# Patient Record
Sex: MALE | Race: WHITE | NOT HISPANIC OR LATINO | Employment: FULL TIME | URBAN - METROPOLITAN AREA
[De-identification: names, ages, dates, MRNs, and addresses within clinical notes are randomized per-mention and may not be internally consistent; named-entity substitution may affect disease eponyms.]

---

## 2020-06-08 DIAGNOSIS — Z20.822 SUSPECTED COVID-19 VIRUS INFECTION: ICD-10-CM

## 2020-10-09 DIAGNOSIS — Z03.818 ENCOUNTER FOR OBSERVATION FOR SUSPECTED EXPOSURE TO OTHER BIOLOGICAL AGENTS RULED OUT: ICD-10-CM

## 2020-10-10 ENCOUNTER — LAB VISIT (OUTPATIENT)
Dept: URGENT CARE | Facility: CLINIC | Age: 30
End: 2020-10-10

## 2020-10-10 DIAGNOSIS — Z03.818 ENCOUNTER FOR OBSERVATION FOR SUSPECTED EXPOSURE TO OTHER BIOLOGICAL AGENTS RULED OUT: Primary | ICD-10-CM

## 2020-10-10 LAB
CTP QC/QA: YES
SARS-COV-2 RDRP RESP QL NAA+PROBE: NEGATIVE

## 2020-10-10 PROCEDURE — 99199 UNLISTED SPECIAL SVC PX/RPRT: CPT | Mod: S$GLB,,, | Performed by: EMERGENCY MEDICINE

## 2020-10-10 PROCEDURE — 99199 CV19 SPECIMEN HANDLING FEE / SWAB: ICD-10-PCS | Mod: S$GLB,,, | Performed by: EMERGENCY MEDICINE

## 2020-10-10 PROCEDURE — U0002 COVID-19 LAB TEST NON-CDC: HCPCS | Mod: QW,S$GLB,, | Performed by: EMERGENCY MEDICINE

## 2020-10-10 PROCEDURE — U0002: ICD-10-PCS | Mod: QW,S$GLB,, | Performed by: EMERGENCY MEDICINE

## 2021-04-13 ENCOUNTER — OCCUPATIONAL HEALTH (OUTPATIENT)
Dept: URGENT CARE | Facility: CLINIC | Age: 31
End: 2021-04-13

## 2021-04-13 DIAGNOSIS — Z20.822 ENCOUNTER FOR LABORATORY TESTING FOR COVID-19 VIRUS: Primary | ICD-10-CM

## 2021-04-13 LAB
CTP QC/QA: YES
SARS-COV-2 RDRP RESP QL NAA+PROBE: NEGATIVE

## 2021-04-13 PROCEDURE — U0002 COVID-19 LAB TEST NON-CDC: HCPCS | Mod: QW,S$GLB,, | Performed by: NURSE PRACTITIONER

## 2021-04-13 PROCEDURE — U0002: ICD-10-PCS | Mod: QW,S$GLB,, | Performed by: NURSE PRACTITIONER

## 2021-04-28 ENCOUNTER — PATIENT MESSAGE (OUTPATIENT)
Dept: RESEARCH | Facility: HOSPITAL | Age: 31
End: 2021-04-28

## 2021-05-11 ENCOUNTER — OCCUPATIONAL HEALTH (OUTPATIENT)
Dept: URGENT CARE | Facility: CLINIC | Age: 31
End: 2021-05-11

## 2021-05-11 DIAGNOSIS — Z11.9 ENCOUNTER FOR SCREENING EXAMINATION FOR INFECTIOUS DISEASE: Primary | ICD-10-CM

## 2021-05-11 LAB
CTP QC/QA: YES
SARS-COV-2 RDRP RESP QL NAA+PROBE: NEGATIVE

## 2021-05-11 PROCEDURE — U0002: ICD-10-PCS | Mod: QW,S$GLB,, | Performed by: PHYSICIAN ASSISTANT

## 2021-05-11 PROCEDURE — U0002 COVID-19 LAB TEST NON-CDC: HCPCS | Mod: QW,S$GLB,, | Performed by: PHYSICIAN ASSISTANT

## 2021-06-08 ENCOUNTER — OCCUPATIONAL HEALTH (OUTPATIENT)
Dept: URGENT CARE | Facility: CLINIC | Age: 31
End: 2021-06-08

## 2021-06-08 DIAGNOSIS — Z11.52 ENCOUNTER FOR SCREENING FOR COVID-19: Primary | ICD-10-CM

## 2021-06-08 LAB
CTP QC/QA: YES
SARS-COV-2 RDRP RESP QL NAA+PROBE: NEGATIVE

## 2021-06-08 PROCEDURE — U0002: ICD-10-PCS | Mod: QW,S$GLB,, | Performed by: NURSE PRACTITIONER

## 2021-06-08 PROCEDURE — U0002 COVID-19 LAB TEST NON-CDC: HCPCS | Mod: QW,S$GLB,, | Performed by: NURSE PRACTITIONER

## 2023-06-12 ENCOUNTER — OFFICE VISIT (OUTPATIENT)
Dept: INTERNAL MEDICINE | Facility: CLINIC | Age: 33
End: 2023-06-12
Payer: COMMERCIAL

## 2023-06-12 VITALS
HEIGHT: 64 IN | DIASTOLIC BLOOD PRESSURE: 92 MMHG | SYSTOLIC BLOOD PRESSURE: 136 MMHG | TEMPERATURE: 98 F | HEART RATE: 100 BPM | BODY MASS INDEX: 25.13 KG/M2 | RESPIRATION RATE: 20 BRPM | OXYGEN SATURATION: 98 % | WEIGHT: 147.19 LBS

## 2023-06-12 DIAGNOSIS — F41.9 ANXIETY: Primary | ICD-10-CM

## 2023-06-12 DIAGNOSIS — G43.109 MIGRAINE WITH AURA AND WITHOUT STATUS MIGRAINOSUS, NOT INTRACTABLE: ICD-10-CM

## 2023-06-12 DIAGNOSIS — R03.0 ELEVATED BLOOD PRESSURE READING: ICD-10-CM

## 2023-06-12 PROCEDURE — 99203 PR OFFICE/OUTPT VISIT, NEW, LEVL III, 30-44 MIN: ICD-10-PCS | Mod: S$GLB,,, | Performed by: PHYSICIAN ASSISTANT

## 2023-06-12 PROCEDURE — 99999 PR PBB SHADOW E&M-EST. PATIENT-LVL IV: ICD-10-PCS | Mod: PBBFAC,,, | Performed by: PHYSICIAN ASSISTANT

## 2023-06-12 PROCEDURE — 3080F PR MOST RECENT DIASTOLIC BLOOD PRESSURE >= 90 MM HG: ICD-10-PCS | Mod: CPTII,S$GLB,, | Performed by: PHYSICIAN ASSISTANT

## 2023-06-12 PROCEDURE — 3008F PR BODY MASS INDEX (BMI) DOCUMENTED: ICD-10-PCS | Mod: CPTII,S$GLB,, | Performed by: PHYSICIAN ASSISTANT

## 2023-06-12 PROCEDURE — 1160F RVW MEDS BY RX/DR IN RCRD: CPT | Mod: CPTII,S$GLB,, | Performed by: PHYSICIAN ASSISTANT

## 2023-06-12 PROCEDURE — 1159F PR MEDICATION LIST DOCUMENTED IN MEDICAL RECORD: ICD-10-PCS | Mod: CPTII,S$GLB,, | Performed by: PHYSICIAN ASSISTANT

## 2023-06-12 PROCEDURE — 3075F SYST BP GE 130 - 139MM HG: CPT | Mod: CPTII,S$GLB,, | Performed by: PHYSICIAN ASSISTANT

## 2023-06-12 PROCEDURE — 99999 PR PBB SHADOW E&M-EST. PATIENT-LVL IV: CPT | Mod: PBBFAC,,, | Performed by: PHYSICIAN ASSISTANT

## 2023-06-12 PROCEDURE — 99203 OFFICE O/P NEW LOW 30 MIN: CPT | Mod: S$GLB,,, | Performed by: PHYSICIAN ASSISTANT

## 2023-06-12 PROCEDURE — 3080F DIAST BP >= 90 MM HG: CPT | Mod: CPTII,S$GLB,, | Performed by: PHYSICIAN ASSISTANT

## 2023-06-12 PROCEDURE — 3075F PR MOST RECENT SYSTOLIC BLOOD PRESS GE 130-139MM HG: ICD-10-PCS | Mod: CPTII,S$GLB,, | Performed by: PHYSICIAN ASSISTANT

## 2023-06-12 PROCEDURE — 1160F PR REVIEW ALL MEDS BY PRESCRIBER/CLIN PHARMACIST DOCUMENTED: ICD-10-PCS | Mod: CPTII,S$GLB,, | Performed by: PHYSICIAN ASSISTANT

## 2023-06-12 PROCEDURE — 1159F MED LIST DOCD IN RCRD: CPT | Mod: CPTII,S$GLB,, | Performed by: PHYSICIAN ASSISTANT

## 2023-06-12 PROCEDURE — 3008F BODY MASS INDEX DOCD: CPT | Mod: CPTII,S$GLB,, | Performed by: PHYSICIAN ASSISTANT

## 2023-06-12 RX ORDER — HYDROXYZINE HYDROCHLORIDE 25 MG/1
25 TABLET, FILM COATED ORAL 3 TIMES DAILY PRN
Qty: 30 TABLET | Refills: 0 | Status: SHIPPED | OUTPATIENT
Start: 2023-06-12

## 2023-06-12 RX ORDER — BUTALBITAL, ACETAMINOPHEN AND CAFFEINE 50; 325; 40 MG/1; MG/1; MG/1
1 TABLET ORAL EVERY 6 HOURS PRN
Qty: 30 TABLET | Refills: 0 | Status: SHIPPED | OUTPATIENT
Start: 2023-06-12 | End: 2023-06-13 | Stop reason: SDUPTHER

## 2023-06-12 NOTE — LETTER
June 12, 2023    Caesar Abreu  [address] [place] [address]             [facility]  Internal Medicine  [address] [place] [id number]  Phone: [phone]  Fax: [phone]   June 12, 2023     Patient: Caesar Abreu   YOB: 1990   Date of Visit: 6/12/2023       To Whom it May Concern:    Caesar Abreu was seen in my clinic on 6/12/2023. He may return to work on 06/13/2023 .    Please excuse him from any classes or work missed.    If you have any questions or concerns, please don't hesitate to call.    Sincerely,         Kitty Oropeza PA-C

## 2023-06-13 ENCOUNTER — PATIENT MESSAGE (OUTPATIENT)
Dept: INTERNAL MEDICINE | Facility: CLINIC | Age: 33
End: 2023-06-13
Payer: COMMERCIAL

## 2023-06-13 PROBLEM — F41.9 ANXIETY: Status: ACTIVE | Noted: 2023-06-13

## 2023-06-13 PROBLEM — G43.109 MIGRAINE WITH AURA AND WITHOUT STATUS MIGRAINOSUS, NOT INTRACTABLE: Status: ACTIVE | Noted: 2023-06-13

## 2023-06-13 PROBLEM — R03.0 ELEVATED BLOOD PRESSURE READING: Status: ACTIVE | Noted: 2023-06-13

## 2023-06-13 RX ORDER — BUTALBITAL, ACETAMINOPHEN AND CAFFEINE 50; 325; 40 MG/1; MG/1; MG/1
1 TABLET ORAL EVERY 6 HOURS PRN
Qty: 30 TABLET | Refills: 0 | Status: SHIPPED | OUTPATIENT
Start: 2023-06-13

## 2023-06-13 NOTE — ASSESSMENT & PLAN NOTE
Start Fioricet as needed, reports migraine headaches weekly, has never seen Neurology, Excedrin helps

## 2023-06-13 NOTE — PROGRESS NOTES
Subjective:       Patient ID: Caesar Abreu is a 32 y.o. male.    Chief Complaint: Hypertension and Headache      HPI  31 y/o male presents for f/u from ED visit for lightheadedness/near-syncope, anxiety, chest tightness, anxiety, elevated BP and headache on 6/9/23. Cardiac work up was negative. CT head was negative. BP was 136/90. Patient reports family history of cardiac disease (HTN and MI).     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 He has history of migraines but this headache was different - location was parietal, BL, to posterior neck, throbbing without phonophobia, photophobia or N/V. Patient took BP on rig which was 155-172/ with HR 60-89. Over the last few months he has stopped energy drinks and pre-workout but will drink 1 cup of coffee and exercises almost daily. Reports sleeps ok but has had anxiety for a long time off and on.     Review of Systems   Constitutional:  Negative for chills, diaphoresis and fever.   Eyes:  Positive for photophobia and visual disturbance (reports blurred spots in his peripheral vision before migraine headaches start). Negative for pain and redness.   Respiratory:  Positive for chest tightness. Negative for shortness of breath.    Cardiovascular:  Negative for chest pain.   Gastrointestinal:  Negative for nausea and vomiting.   Neurological:  Positive for light-headedness and headaches. Negative for dizziness, speech difficulty, weakness and numbness.   Psychiatric/Behavioral:  Negative for dysphoric mood. The patient is nervous/anxious.      Objective:      Physical Exam  Vitals and nursing note reviewed.   Constitutional:       General: He is not in acute distress.     Appearance: He is well-developed.   HENT:      Head: Normocephalic and atraumatic.   Eyes:      General: Lids are normal. No scleral icterus.     Extraocular  Movements: Extraocular movements intact.      Conjunctiva/sclera: Conjunctivae normal.   Cardiovascular:      Rate and Rhythm: Normal rate and regular rhythm.   Pulmonary:      Effort: Pulmonary effort is normal.      Breath sounds: Normal breath sounds. No decreased breath sounds, wheezing, rhonchi or rales.   Neurological:      Mental Status: He is alert.      Cranial Nerves: No cranial nerve deficit.   Psychiatric:         Mood and Affect: Mood and affect normal.       Assessment:       1. Anxiety    2. Elevated blood pressure reading    3. Migraine with aura and without status migrainosus, not intractable        Plan:   1. Anxiety  Assessment & Plan:  Start hydroxyzine as needed. Encouraged relaxation, deep breathing exercises, reading    Orders:  -     hydrOXYzine HCL (ATARAX) 25 MG tablet; Take 1 tablet (25 mg total) by mouth 3 (three) times daily as needed for Anxiety.  Dispense: 30 tablet; Refill: 0    2. Elevated blood pressure reading  Assessment & Plan:  /92, declines medication at this time, will RTC 2 weeks for NV recheck BP      3. Migraine with aura and without status migrainosus, not intractable  Assessment & Plan:  Start Fioricet as needed, reports migraine headaches weekly, has never seen Neurology, Excedrin helps    Orders:  -     butalbital-acetaminophen-caffeine -40 mg (FIORICET, ESGIC) -40 mg per tablet; Take 1 tablet by mouth every 6 (six) hours as needed for Headaches.  Dispense: 30 tablet; Refill: 0

## 2023-06-14 ENCOUNTER — TELEPHONE (OUTPATIENT)
Dept: INTERNAL MEDICINE | Facility: CLINIC | Age: 33
End: 2023-06-14
Payer: COMMERCIAL

## 2023-06-14 NOTE — TELEPHONE ENCOUNTER
Spoke to Kathy with [facility] and gave her the DANA number and verbal order for the Fioricet.

## 2023-06-14 NOTE — TELEPHONE ENCOUNTER
----- Message from Alesha Serna sent at 6/13/2023  4:37 PM CDT -----  Contact: [facility]Jeimy Munson called regarding the medication butalbital-acetaminophen-caffeine -40 mg (FIORICET, ESGIC) -40 mg per tablet. The DANA information is missing. Please rescribe.    Thanks  TS

## 2023-06-27 ENCOUNTER — OFFICE VISIT (OUTPATIENT)
Dept: INTERNAL MEDICINE | Facility: CLINIC | Age: 33
End: 2023-06-27
Payer: COMMERCIAL

## 2023-06-27 ENCOUNTER — CLINICAL SUPPORT (OUTPATIENT)
Dept: INTERNAL MEDICINE | Facility: CLINIC | Age: 33
End: 2023-06-27
Payer: COMMERCIAL

## 2023-06-27 VITALS
HEART RATE: 59 BPM | OXYGEN SATURATION: 98 % | SYSTOLIC BLOOD PRESSURE: 118 MMHG | BODY MASS INDEX: 25.75 KG/M2 | WEIGHT: 150.81 LBS | TEMPERATURE: 98 F | HEIGHT: 64 IN | DIASTOLIC BLOOD PRESSURE: 80 MMHG

## 2023-06-27 VITALS — SYSTOLIC BLOOD PRESSURE: 120 MMHG | DIASTOLIC BLOOD PRESSURE: 84 MMHG

## 2023-06-27 DIAGNOSIS — G43.109 MIGRAINE WITH AURA AND WITHOUT STATUS MIGRAINOSUS, NOT INTRACTABLE: ICD-10-CM

## 2023-06-27 DIAGNOSIS — Z91.018 FOOD ALLERGY: ICD-10-CM

## 2023-06-27 DIAGNOSIS — Z00.00 ROUTINE GENERAL MEDICAL EXAMINATION AT A HEALTH CARE FACILITY: Primary | ICD-10-CM

## 2023-06-27 DIAGNOSIS — Z23 NEED FOR DIPHTHERIA-TETANUS-PERTUSSIS (TDAP) VACCINE: ICD-10-CM

## 2023-06-27 DIAGNOSIS — H53.8 BLURRED VISION: ICD-10-CM

## 2023-06-27 DIAGNOSIS — F41.9 ANXIETY: ICD-10-CM

## 2023-06-27 PROCEDURE — 3079F DIAST BP 80-89 MM HG: CPT | Mod: CPTII,S$GLB,, | Performed by: INTERNAL MEDICINE

## 2023-06-27 PROCEDURE — 3008F BODY MASS INDEX DOCD: CPT | Mod: CPTII,S$GLB,, | Performed by: INTERNAL MEDICINE

## 2023-06-27 PROCEDURE — 90471 IMMUNIZATION ADMIN: CPT | Mod: S$GLB,,, | Performed by: INTERNAL MEDICINE

## 2023-06-27 PROCEDURE — 90715 TDAP VACCINE 7 YRS/> IM: CPT | Mod: S$GLB,,, | Performed by: INTERNAL MEDICINE

## 2023-06-27 PROCEDURE — 99395 PR PREVENTIVE VISIT,EST,18-39: ICD-10-PCS | Mod: 25,S$GLB,, | Performed by: INTERNAL MEDICINE

## 2023-06-27 PROCEDURE — 99999 PR PBB SHADOW E&M-EST. PATIENT-LVL IV: ICD-10-PCS | Mod: PBBFAC,,, | Performed by: INTERNAL MEDICINE

## 2023-06-27 PROCEDURE — 99395 PREV VISIT EST AGE 18-39: CPT | Mod: 25,S$GLB,, | Performed by: INTERNAL MEDICINE

## 2023-06-27 PROCEDURE — 1159F MED LIST DOCD IN RCRD: CPT | Mod: CPTII,S$GLB,, | Performed by: INTERNAL MEDICINE

## 2023-06-27 PROCEDURE — 90471 TDAP VACCINE GREATER THAN OR EQUAL TO 7YO IM: ICD-10-PCS | Mod: S$GLB,,, | Performed by: INTERNAL MEDICINE

## 2023-06-27 PROCEDURE — 99214 OFFICE O/P EST MOD 30 MIN: CPT | Mod: 25,S$GLB,, | Performed by: INTERNAL MEDICINE

## 2023-06-27 PROCEDURE — 99214 PR OFFICE/OUTPT VISIT, EST, LEVL IV, 30-39 MIN: ICD-10-PCS | Mod: 25,S$GLB,, | Performed by: INTERNAL MEDICINE

## 2023-06-27 PROCEDURE — 99999 PR PBB SHADOW E&M-EST. PATIENT-LVL I: CPT | Mod: PBBFAC,,,

## 2023-06-27 PROCEDURE — 3008F PR BODY MASS INDEX (BMI) DOCUMENTED: ICD-10-PCS | Mod: CPTII,S$GLB,, | Performed by: INTERNAL MEDICINE

## 2023-06-27 PROCEDURE — 3074F SYST BP LT 130 MM HG: CPT | Mod: CPTII,S$GLB,, | Performed by: INTERNAL MEDICINE

## 2023-06-27 PROCEDURE — 3074F PR MOST RECENT SYSTOLIC BLOOD PRESSURE < 130 MM HG: ICD-10-PCS | Mod: CPTII,S$GLB,, | Performed by: INTERNAL MEDICINE

## 2023-06-27 PROCEDURE — 99999 PR PBB SHADOW E&M-EST. PATIENT-LVL I: ICD-10-PCS | Mod: PBBFAC,,,

## 2023-06-27 PROCEDURE — 3079F PR MOST RECENT DIASTOLIC BLOOD PRESSURE 80-89 MM HG: ICD-10-PCS | Mod: CPTII,S$GLB,, | Performed by: INTERNAL MEDICINE

## 2023-06-27 PROCEDURE — 90715 TDAP VACCINE GREATER THAN OR EQUAL TO 7YO IM: ICD-10-PCS | Mod: S$GLB,,, | Performed by: INTERNAL MEDICINE

## 2023-06-27 PROCEDURE — 99999 PR PBB SHADOW E&M-EST. PATIENT-LVL IV: CPT | Mod: PBBFAC,,, | Performed by: INTERNAL MEDICINE

## 2023-06-27 PROCEDURE — 1159F PR MEDICATION LIST DOCUMENTED IN MEDICAL RECORD: ICD-10-PCS | Mod: CPTII,S$GLB,, | Performed by: INTERNAL MEDICINE

## 2023-06-27 RX ORDER — TOPIRAMATE 25 MG/1
25 TABLET ORAL NIGHTLY
Qty: 90 TABLET | Refills: 0 | Status: SHIPPED | OUTPATIENT
Start: 2023-06-27 | End: 2023-09-18

## 2023-06-27 NOTE — PROGRESS NOTES
Subjective:      Patient ID: Caesar Abreu is a 32 y.o. male.    Chief Complaint: Establish Care    HPI      32 y.o. with  Patient Active Problem List   Diagnosis    Elevated blood pressure reading    Anxiety    Migraine with aura and without status migrainosus, not intractable    Routine general medical examination at a health care facility     Past Medical History:   Diagnosis Date    History of chicken pox        Here today for annual prev exam.  Compliant with meds without significant side effects. Energy and appetite are good.     Pt also c/o migraines as a child. Has had HAs 2 to 3 days per week. Typically uses tylenol/ibu/excedrin. Previously migraines were once monthly. Typical migraine is usually behind his eyes. aching. . Has assoc photophobia. Has visual aura of white spots and decreased peripheral vision.  No phonophobia.  Was given butalbital recently. Did not start as tyl/ibu/excedrin is working.   PE: cn 2 -12 intact, ncat, nl gait, goot ftn    Pt also c/o months of sharp pain temporal area and blurred vision when driving. Worse with bright light. Seems to occur when looking up from dash into the distance. Can alternate sides. Lasts for seconds.     Pt also reports throat itching with shrimp, crawfish, crabs.    Pt also c/o anxiety although Does not feel anxious regularly. Has some anxiety feelings about once q 3 months. Has hydroxyzine prn.     Past Surgical History:   Procedure Laterality Date    ADENOIDECTOMY      arm fracture Right     FRACTURE SURGERY  2007    Right arm    TYMPANOSTOMY TUBE PLACEMENT       Social History     Socioeconomic History    Marital status: Single   Tobacco Use    Smoking status: Never    Smokeless tobacco: Never   Substance and Sexual Activity    Alcohol use: Yes     Alcohol/week: 2.0 standard drinks     Types: 2 Shots of liquor per week     Comment: twice a week     Drug use: Never    Sexual activity: Yes     Partners: Female     Birth control/protection: Inserts  "    family history includes COPD in his maternal grandfather; Cancer in his father, maternal grandfather, and maternal grandmother; Diabetes in his maternal grandmother; Hearing loss in his maternal grandmother; No Known Problems in his mother; Stroke in his maternal grandmother. Father dx with colon cancer at 69 yo.     Review of Systems   Constitutional:  Negative for chills and fever.   HENT:  Negative for ear pain and sore throat.    Respiratory:  Negative for cough.    Cardiovascular:  Negative for chest pain.   Gastrointestinal:  Negative for abdominal pain and blood in stool.   Genitourinary:  Negative for dysuria and hematuria.   Neurological:  Negative for seizures and syncope.   Objective:   /80 (BP Location: Left arm, Patient Position: Sitting, BP Method: Medium (Manual))   Pulse (!) 59   Temp 97.5 °F (36.4 °C) (Tympanic)   Ht 5' 4" (1.626 m)   Wt 68.4 kg (150 lb 12.7 oz)   SpO2 98%   BMI 25.88 kg/m²     Physical Exam  Constitutional:       General: He is not in acute distress.     Appearance: He is well-developed.   HENT:      Head: Normocephalic and atraumatic.   Eyes:      Extraocular Movements: Extraocular movements intact.   Neck:      Thyroid: No thyromegaly.   Cardiovascular:      Rate and Rhythm: Normal rate and regular rhythm.   Pulmonary:      Breath sounds: Normal breath sounds. No wheezing or rales.   Abdominal:      General: Bowel sounds are normal.      Palpations: Abdomen is soft.      Tenderness: There is no abdominal tenderness.   Musculoskeletal:         General: No swelling.      Cervical back: Neck supple. No rigidity.   Lymphadenopathy:      Cervical: No cervical adenopathy.   Skin:     General: Skin is warm and dry.   Neurological:      Mental Status: He is alert and oriented to person, place, and time.   Psychiatric:         Behavior: Behavior normal.       Lab Results   Component Value Date    WBC 11.20 06/10/2023    HGB 15.0 06/10/2023    HCT 41.6 06/10/2023    MCV 87 " 06/10/2023     06/10/2023    ALT 43 06/10/2023    AST 39 06/10/2023     06/10/2023    K 4.1 06/10/2023    CALCIUM 9.2 06/10/2023     06/10/2023    CO2 25 06/10/2023    BUN 16 06/10/2023    CREATININE 0.95 06/10/2023    EGFRNORACEVR >60 06/10/2023    TSH 2.21 06/10/2023     06/10/2023          The ASCVD Risk score (Fred THOMAS, et al., 2019) failed to calculate for the following reasons:    The 2019 ASCVD risk score is only valid for ages 40 to 79     Assessment:     1. Routine general medical examination at a health care facility    2. Blurred vision    3. Food allergy    4. Need for diphtheria-tetanus-pertussis (Tdap) vaccine    5. Migraine with aura and without status migrainosus, not intractable    6. Anxiety      Plan:   1. Routine general medical examination at a health care facility  Overview:  Heart healthy diet, regular exercise, and regular use of sunscreen.    reviewed    Orders:  -     Lipid Panel; Future; Expected date: 06/27/2023  -     Hepatitis C Antibody; Future; Expected date: 06/27/2023  -     HIV 1/2 Ag/Ab (4th Gen); Future; Expected date: 06/27/2023    2. Blurred vision  -     Ambulatory referral/consult to Optometry; Future; Expected date: 07/04/2023    3. Food allergy  -     Ambulatory referral/consult to Allergy; Future; Expected date: 07/04/2023    4. Need for diphtheria-tetanus-pertussis (Tdap) vaccine  -     (In Office Administered) Tdap Vaccine    5. Migraine with aura and without status migrainosus, not intractable  -     topiramate (TOPAMAX) 25 MG tablet; Take 1 tablet (25 mg total) by mouth every evening.  Dispense: 90 tablet; Refill: 0    6. Anxiety  Overview:  Stable on prn hydroxyzine.           There are no Patient Instructions on file for this visit.    Future Appointments   Date Time Provider Department Center   7/3/2023  8:55 AM LABORATORY, [facility] [place]   8/21/2023  8:15 AM Apolinar Caballero OD [facility] OPHTHAL [facility] [place]   9/19/2023  3:00 PM Chata HALL  MD [facility] Alexandra ALLERGY [place]   10/16/2023  8:40 AM MD VANNA Hernandez IM [place]           Follow up in about 4 weeks (around 7/25/2023), or if symptoms worsen or fail to improve, for fasting labs next avail.

## 2023-07-01 PROBLEM — Z00.00 ROUTINE GENERAL MEDICAL EXAMINATION AT A HEALTH CARE FACILITY: Status: ACTIVE | Noted: 2023-07-01

## 2023-07-03 ENCOUNTER — LAB VISIT (OUTPATIENT)
Dept: LAB | Facility: HOSPITAL | Age: 33
End: 2023-07-03
Attending: INTERNAL MEDICINE
Payer: COMMERCIAL

## 2023-07-03 DIAGNOSIS — Z00.00 ROUTINE GENERAL MEDICAL EXAMINATION AT A HEALTH CARE FACILITY: ICD-10-CM

## 2023-07-03 LAB
CHOLEST SERPL-MCNC: 166 MG/DL (ref 120–199)
CHOLEST/HDLC SERPL: 3.7 {RATIO} (ref 2–5)
HCV AB SERPL QL IA: NORMAL
HDLC SERPL-MCNC: 45 MG/DL (ref 40–75)
HDLC SERPL: 27.1 % (ref 20–50)
HIV 1+2 AB+HIV1 P24 AG SERPL QL IA: NORMAL
LDLC SERPL CALC-MCNC: 105.6 MG/DL (ref 63–159)
NONHDLC SERPL-MCNC: 121 MG/DL
TRIGL SERPL-MCNC: 77 MG/DL (ref 30–150)

## 2023-07-03 PROCEDURE — 86803 HEPATITIS C AB TEST: CPT | Performed by: INTERNAL MEDICINE

## 2023-07-03 PROCEDURE — 36415 COLL VENOUS BLD VENIPUNCTURE: CPT | Performed by: INTERNAL MEDICINE

## 2023-07-03 PROCEDURE — 87389 HIV-1 AG W/HIV-1&-2 AB AG IA: CPT | Performed by: INTERNAL MEDICINE

## 2023-07-03 PROCEDURE — 80061 LIPID PANEL: CPT | Performed by: INTERNAL MEDICINE

## 2023-08-20 ENCOUNTER — TELEPHONE (OUTPATIENT)
Dept: ALLERGY | Facility: CLINIC | Age: 33
End: 2023-08-20
Payer: COMMERCIAL

## 2023-08-20 NOTE — TELEPHONE ENCOUNTER
Called pt and informed him that we will have to cancel the appt due to Dr. Alexandra being out due to an emergency. informed him that we will call him back to reschedule once we know for sure when she will be back in clinic.

## 2023-08-21 ENCOUNTER — OFFICE VISIT (OUTPATIENT)
Dept: OPHTHALMOLOGY | Facility: CLINIC | Age: 33
End: 2023-08-21
Payer: COMMERCIAL

## 2023-08-21 DIAGNOSIS — H52.203 HYPEROPIA OF BOTH EYES WITH ASTIGMATISM: ICD-10-CM

## 2023-08-21 DIAGNOSIS — H53.8 BLURRED VISION: ICD-10-CM

## 2023-08-21 DIAGNOSIS — H53.9 VISUAL DISTURBANCE: Primary | ICD-10-CM

## 2023-08-21 DIAGNOSIS — R51.9 FREQUENT HEADACHES: ICD-10-CM

## 2023-08-21 DIAGNOSIS — H52.03 HYPEROPIA OF BOTH EYES WITH ASTIGMATISM: ICD-10-CM

## 2023-08-21 PROCEDURE — 92004 COMPRE OPH EXAM NEW PT 1/>: CPT | Mod: S$GLB,,, | Performed by: OPTOMETRIST

## 2023-08-21 PROCEDURE — 99999 PR PBB SHADOW E&M-EST. PATIENT-LVL III: CPT | Mod: PBBFAC,,, | Performed by: OPTOMETRIST

## 2023-08-21 PROCEDURE — 92015 PR REFRACTION: ICD-10-PCS | Mod: S$GLB,,, | Performed by: OPTOMETRIST

## 2023-08-21 PROCEDURE — 1159F MED LIST DOCD IN RCRD: CPT | Mod: CPTII,S$GLB,, | Performed by: OPTOMETRIST

## 2023-08-21 PROCEDURE — 99999 PR PBB SHADOW E&M-EST. PATIENT-LVL III: ICD-10-PCS | Mod: PBBFAC,,, | Performed by: OPTOMETRIST

## 2023-08-21 PROCEDURE — 92015 DETERMINE REFRACTIVE STATE: CPT | Mod: S$GLB,,, | Performed by: OPTOMETRIST

## 2023-08-21 PROCEDURE — 92004 PR EYE EXAM, NEW PATIENT,COMPREHESV: ICD-10-PCS | Mod: S$GLB,,, | Performed by: OPTOMETRIST

## 2023-08-21 PROCEDURE — 1159F PR MEDICATION LIST DOCUMENTED IN MEDICAL RECORD: ICD-10-PCS | Mod: CPTII,S$GLB,, | Performed by: OPTOMETRIST

## 2023-08-21 NOTE — PROGRESS NOTES
HPI     Annual Exam            Comments: NP to DKT  Patient here today for yearly eye exam          Comments    Vision changes since last eye exam?: Yes at distance  No correction     Any eye pain today: No    Other ocular symptoms: No    Interested in contact lens fitting today? No                      Last edited by Sonia Guerrero, PCT on 8/21/2023  8:19 AM.            Assessment /Plan     For exam results, see Encounter Report.    Visual disturbance  Refracts well on exam today. Mrx provided for PRN. RTC for HVF 30-2 in 4 weeks.     Hyperopia of both eyes with astigmatism  Eyeglass Final Rx       Eyeglass Final Rx         Sphere Cylinder Axis    Right +0.50 +1.25 006    Left +0.50 +0.50 011      Type: SVL    Expiration Date: 8/21/2024                    Frequent headaches  Not likely ocular in origin  Minimal refractive error  Normal, well-perfused optic nerves bilaterally with no edema  Continue care with PCP and/or specialists     RTC 4 weeks for HVF 30-2, headache f/u and pupil recheck. Sooner if any changes to vision or worsening symptoms.

## 2023-09-17 DIAGNOSIS — G43.109 MIGRAINE WITH AURA AND WITHOUT STATUS MIGRAINOSUS, NOT INTRACTABLE: ICD-10-CM

## 2023-09-18 ENCOUNTER — OFFICE VISIT (OUTPATIENT)
Dept: ALLERGY | Facility: CLINIC | Age: 33
End: 2023-09-18
Payer: COMMERCIAL

## 2023-09-18 ENCOUNTER — LAB VISIT (OUTPATIENT)
Dept: LAB | Facility: HOSPITAL | Age: 33
End: 2023-09-18
Attending: ALLERGY & IMMUNOLOGY
Payer: COMMERCIAL

## 2023-09-18 VITALS
HEART RATE: 53 BPM | BODY MASS INDEX: 25.67 KG/M2 | TEMPERATURE: 98 F | HEIGHT: 64 IN | WEIGHT: 150.38 LBS | SYSTOLIC BLOOD PRESSURE: 143 MMHG | DIASTOLIC BLOOD PRESSURE: 86 MMHG

## 2023-09-18 DIAGNOSIS — Z91.018 FOOD ALLERGY: ICD-10-CM

## 2023-09-18 DIAGNOSIS — Z91.018 FOOD ALLERGY: Primary | ICD-10-CM

## 2023-09-18 DIAGNOSIS — J31.0 RHINITIS, UNSPECIFIED TYPE: ICD-10-CM

## 2023-09-18 DIAGNOSIS — J30.81 ALLERGIC RHINITIS DUE TO CATS: ICD-10-CM

## 2023-09-18 DIAGNOSIS — J30.89 ALLERGIC RHINITIS DUE TO DERMATOPHAGOIDES PTERONYSSINUS: ICD-10-CM

## 2023-09-18 DIAGNOSIS — R03.0 ELEVATED BLOOD PRESSURE READING: ICD-10-CM

## 2023-09-18 DIAGNOSIS — Z88.8: ICD-10-CM

## 2023-09-18 PROCEDURE — 86003 ALLG SPEC IGE CRUDE XTRC EA: CPT | Mod: 59 | Performed by: ALLERGY & IMMUNOLOGY

## 2023-09-18 PROCEDURE — 3079F PR MOST RECENT DIASTOLIC BLOOD PRESSURE 80-89 MM HG: ICD-10-PCS | Mod: CPTII,S$GLB,, | Performed by: ALLERGY & IMMUNOLOGY

## 2023-09-18 PROCEDURE — 3008F PR BODY MASS INDEX (BMI) DOCUMENTED: ICD-10-PCS | Mod: CPTII,S$GLB,, | Performed by: ALLERGY & IMMUNOLOGY

## 2023-09-18 PROCEDURE — 95004 PR ALLERGY SKIN TESTS,ALLERGENS: ICD-10-PCS | Mod: S$GLB,,, | Performed by: ALLERGY & IMMUNOLOGY

## 2023-09-18 PROCEDURE — 99204 OFFICE O/P NEW MOD 45 MIN: CPT | Mod: 25,S$GLB,, | Performed by: ALLERGY & IMMUNOLOGY

## 2023-09-18 PROCEDURE — 99999 PR PBB SHADOW E&M-EST. PATIENT-LVL IV: ICD-10-PCS | Mod: PBBFAC,,, | Performed by: ALLERGY & IMMUNOLOGY

## 2023-09-18 PROCEDURE — 3077F SYST BP >= 140 MM HG: CPT | Mod: CPTII,S$GLB,, | Performed by: ALLERGY & IMMUNOLOGY

## 2023-09-18 PROCEDURE — 3077F PR MOST RECENT SYSTOLIC BLOOD PRESSURE >= 140 MM HG: ICD-10-PCS | Mod: CPTII,S$GLB,, | Performed by: ALLERGY & IMMUNOLOGY

## 2023-09-18 PROCEDURE — 99204 PR OFFICE/OUTPT VISIT, NEW, LEVL IV, 45-59 MIN: ICD-10-PCS | Mod: 25,S$GLB,, | Performed by: ALLERGY & IMMUNOLOGY

## 2023-09-18 PROCEDURE — 3079F DIAST BP 80-89 MM HG: CPT | Mod: CPTII,S$GLB,, | Performed by: ALLERGY & IMMUNOLOGY

## 2023-09-18 PROCEDURE — 1159F MED LIST DOCD IN RCRD: CPT | Mod: CPTII,S$GLB,, | Performed by: ALLERGY & IMMUNOLOGY

## 2023-09-18 PROCEDURE — 1159F PR MEDICATION LIST DOCUMENTED IN MEDICAL RECORD: ICD-10-PCS | Mod: CPTII,S$GLB,, | Performed by: ALLERGY & IMMUNOLOGY

## 2023-09-18 PROCEDURE — 3008F BODY MASS INDEX DOCD: CPT | Mod: CPTII,S$GLB,, | Performed by: ALLERGY & IMMUNOLOGY

## 2023-09-18 PROCEDURE — 95004 PERQ TESTS W/ALRGNC XTRCS: CPT | Mod: S$GLB,,, | Performed by: ALLERGY & IMMUNOLOGY

## 2023-09-18 PROCEDURE — 99999 PR PBB SHADOW E&M-EST. PATIENT-LVL IV: CPT | Mod: PBBFAC,,, | Performed by: ALLERGY & IMMUNOLOGY

## 2023-09-18 PROCEDURE — 86003 ALLG SPEC IGE CRUDE XTRC EA: CPT | Performed by: ALLERGY & IMMUNOLOGY

## 2023-09-18 PROCEDURE — 36415 COLL VENOUS BLD VENIPUNCTURE: CPT | Performed by: ALLERGY & IMMUNOLOGY

## 2023-09-18 RX ORDER — TOPIRAMATE 25 MG/1
25 TABLET ORAL NIGHTLY
Qty: 90 TABLET | Refills: 0 | Status: SHIPPED | OUTPATIENT
Start: 2023-09-18 | End: 2023-11-13 | Stop reason: SDUPTHER

## 2023-09-18 RX ORDER — LEVOCETIRIZINE DIHYDROCHLORIDE 5 MG/1
5 TABLET, FILM COATED ORAL NIGHTLY
Qty: 30 TABLET | Refills: 11 | Status: SHIPPED | OUTPATIENT
Start: 2023-09-18 | End: 2024-09-17

## 2023-09-18 RX ORDER — EPINEPHRINE 0.3 MG/.3ML
1 INJECTION SUBCUTANEOUS ONCE
Qty: 2 EACH | Refills: 3 | Status: SHIPPED | OUTPATIENT
Start: 2023-09-18 | End: 2023-11-14

## 2023-09-18 NOTE — PATIENT INSTRUCTIONS
Dust mites are microscopic insect-like pests that live in house dust. They feed on dead skin or dander that are shed by people and pets.They can worsen asthma and allergies. Dust mites live in mattresses, bedding, upholstered furniture, carpets, and curtains in your home. No matter how clean a home is, dust mites cannot be completely eliminated. A number of things can be done to reduce the number of dust mites:  -Use a dehumidifier or air conditioner to maintain humidity levels at, or below, 50 percent.  -Encase mattress and pillows in dust mite- proof or allergen impermeable covers.  -Wash all bedding and blankets once a week in hot water, 130 to 140 degrees Fahrenheit, to kill dust mites. Non- washable bedding can be frozen overnight.  -Replace wool or feathered bedding products with synthetic materials, and traditional stuffed animals with washable ones.  -In bedrooms, replace wall to wall carpeting with bare floors, and remove fabric curtains and upholstered furniture, whenever possible.  -Use a damp mop or rag to remove dust. Never use a dry cloth, as it stirs up allergens.  -Use a double-layered microfilter bag or a HEPA filter in your vacuum .  -Wear a mask while vacuuming, and stay out of the vacuuming area for 20 minutes after vacuuming, to allow dust and allergens to settle.   Dust mites are microscopic insect-like pests that live in house dust. They feed on dead skin or dander that are shed by people and pets.They can worsen asthma and allergies. Dust mites live in mattresses, bedding, upholstered furniture, carpets, and curtains in your home. No matter how clean a home is, dust mites cannot be completely eliminated. A number of things can be done to reduce the number of dust mites:  -Use a dehumidifier or air conditioner to maintain humidity levels at, or below, 50 percent.  -Encase mattress and pillows in dust mite- proof or allergen impermeable covers.  -Wash all bedding and blankets once a week  in hot water, 130 to 140 degrees Fahrenheit, to kill dust mites. Non- washable bedding can be frozen overnight.  -Replace wool or feathered bedding products with synthetic materials, and traditional stuffed animals with washable ones.  -In bedrooms, replace wall to wall carpeting with bare floors, and remove fabric curtains and upholstered furniture, whenever possible.  -Use a damp mop or rag to remove dust. Never use a dry cloth, as it stirs up allergens.  -Use a double-layered microfilter bag or a HEPA filter in your vacuum .  -Wear a mask while vacuuming, and stay out of the vacuuming area for 20 minutes after vacuuming, to allow dust and allergens to settle.

## 2023-09-18 NOTE — PROGRESS NOTES
"Subjective:      Patient ID: Caesar Abreu is a 32 y.o. male.      Referred by Marc Siegel MD for shellfish and dog allergies.      Chief Complaint:  Allergies (Pt. Wants to see about getting something to help control his allergies with shellfish. He also states he thinks he is allergic to dogs; Pt states that his eyes gets puffy, red and itchy when in contact with dogs. Pt wants to get some test ran. )      HPI:  9/18/2023: 32 year old male  Shellfish- contact with skin- hives  Eat it- throat discomfort, itchy,  He does not have an Epipen.  Crawfish-same but "not as significant "- itchy throat  Avoids shellfish    Dog- puffy eyes, runny nose, itchy  Has a dog    Oral antihistamines prn    "Alcohol flush" Father is .    Eats fish without symptoms    He denies anaphylaxis to a medication, ant, or flying insect venom.      Past Medical History:   Diagnosis Date    History of chicken pox         Family History   Problem Relation Age of Onset    No Known Problems Mother     Cancer Father         prostate    Cancer Maternal Grandmother         breast     Diabetes Maternal Grandmother     Hearing loss Maternal Grandmother     Stroke Maternal Grandmother     Cancer Maternal Grandfather     COPD Maternal Grandfather         Current Outpatient Medications on File Prior to Visit   Medication Sig Dispense Refill    acetaminophen (TYLENOL) 500 MG tablet Take 500 mg by mouth every 6 (six) hours as needed for Pain.      hydrOXYzine HCL (ATARAX) 25 MG tablet Take 1 tablet (25 mg total) by mouth 3 (three) times daily as needed for Anxiety. 30 tablet 0    butalbital-acetaminophen-caffeine -40 mg (FIORICET, ESGIC) -40 mg per tablet Take 1 tablet by mouth every 6 (six) hours as needed for Headaches. (Patient not taking: Reported on 9/18/2023) 30 tablet 0    topiramate (TOPAMAX) 25 MG tablet Take 1 tablet (25 mg total) by mouth every evening. (Patient not taking: Reported on 9/18/2023) 90 tablet 0     No current " facility-administered medications on file prior to visit.        Review of patient's allergies indicates:   Allergen Reactions    Shellfish containing products Hives and Swelling   .    Environmental History: Pets in the home: dogs (1).  Tobacco Smoke in Home: no  Review of Systems   Constitutional:  Negative for chills and fever.   HENT:  Positive for rhinorrhea. Negative for congestion.    Eyes:  Negative for discharge and itching.   Respiratory:  Negative for cough, shortness of breath and wheezing.    Cardiovascular:  Negative for chest pain and leg swelling.   Gastrointestinal:  Negative for nausea and vomiting.   Skin:  Negative for wound.   Allergic/Immunologic: Positive for environmental allergies and food allergies.   Neurological:  Negative for facial asymmetry and speech difficulty.   Psychiatric/Behavioral:  Negative for behavioral problems and suicidal ideas.        Objective:   Physical Exam  Constitutional:       General: He is not in acute distress.     Appearance: Normal appearance. He is not ill-appearing or toxic-appearing.   HENT:      Head: Normocephalic and atraumatic.      Right Ear: Tympanic membrane, ear canal and external ear normal. There is no impacted cerumen.      Left Ear: Tympanic membrane, ear canal and external ear normal. There is no impacted cerumen.      Nose: Nose normal. No congestion or rhinorrhea.      Mouth/Throat:      Pharynx: No posterior oropharyngeal erythema.   Eyes:      General:         Right eye: No discharge.         Left eye: No discharge.      Pupils: Pupils are equal, round, and reactive to light.   Neck:      Thyroid: No thyromegaly.   Cardiovascular:      Rate and Rhythm: Normal rate and regular rhythm.      Heart sounds: Normal heart sounds. No murmur heard.     No friction rub. No gallop.   Pulmonary:      Effort: Pulmonary effort is normal. No respiratory distress.      Breath sounds: Normal breath sounds. No stridor. No wheezing or rales.   Abdominal:       Tenderness: There is no abdominal tenderness.   Musculoskeletal:         General: Normal range of motion.      Cervical back: Normal range of motion and neck supple. No rigidity or tenderness.   Lymphadenopathy:      Cervical: No cervical adenopathy.   Skin:     General: Skin is warm and dry.      Findings: No rash.   Neurological:      General: No focal deficit present.      Mental Status: He is alert and oriented to person, place, and time.      Gait: Gait normal.   Psychiatric:         Mood and Affect: Mood normal.         Behavior: Behavior normal.         Thought Content: Thought content normal.         Judgment: Judgment normal.       Allergy Skin Prick testing  Histamine 8x7, 10 x10  Saline 2 x2  He had an appropriate response to positive and negative controls.  The following were negative and the size of the saline:  Red Maple, Juniper, Pine mix, Dog, Dust mite(F), Cockroach, Alternaria, Aspergillus, Helminthosporium, Bald Dewey  Harts, Hackberry, Ligustrum, Oak, Pecan, Red Cedar, [place], [place]ia, B[place], [place], Red Top/Agrostis Alba, Rye/Lolium, Jovany, English Plantain, Marsh Elder, Pigweed, Ragweed, Russian Thistle, Curvularia/Drechsiera Spicifera, Epicoccum, Fusarium, Hormodendrum/Cladosporium, Penicillium, Monilia/candida, Phoma, Stemphylium    The following were positive:  Cat- 10 x9, 20 x20  Dust mite (Pt)- 10 x10, 15 x10    Cat and dust mite (Pt) were positive today.  I interpreted the test.    Assessment:      1. Food allergy    2. Elevated blood pressure reading    3. Rhinitis, unspecified type    4. Allergic rhinitis due to cats    5. Allergic rhinitis due to Dermatophagoides pteronyssinus    6. 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 for 1 dose  Dispense: 2 each; Refill: 3  -     Shrimp IgE; Future;  Expected date: 09/18/2023  -     Lobster IgE; Future; Expected date: 09/18/2023  -     Clams IgE; Future; Expected date: 09/18/2023  -     Crab IgE; Future; Expected date: 09/18/2023  -     Oyster IgE; Future; Expected date: 09/18/2023  -     ALLERGEN-SCALLOP; Future; Expected date: 09/18/2023    Elevated blood pressure reading    Rhinitis, unspecified type    Allergic rhinitis due to cats  -     levocetirizine (XYZAL) 5 MG tablet; Take 1 tablet (5 mg total) by mouth every evening.  Dispense: 30 tablet; Refill: 11    Allergic rhinitis due to Dermatophagoides pteronyssinus  -     levocetirizine (XYZAL) 5 MG tablet; Take 1 tablet (5 mg total) by mouth every evening.  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 Labs ordered.  Epipen 2 pack-discussed use, care and avoidance.  Allergy Skin prick testing was significant for cat and dust mite (Pt)- allergy avoidance measures were discussed.      RTC 4-6 weeks or sooner, if needed     RADHA DAVILA                    Problems Address                                                 Amount and/or Complexity                                                                      Risk       3           [] 2 or more self-limited or minor problems                      [] Limited                                                                        [] Low                  [] 1 stable chronic illness                                                  Any combination of the two                                               OTC drugs                  []Acute, uncomplicated illness or injury                            Review of prior external notes from unique source           Minor surgery with no risk factors                                                                                                               [] 1 []2  []3+                                                                                                               Review of results from each unique test                                                                                                               [] 1 []2  [] 3+                                                                                                              Order of each unique test                                                                                                               [] 1 []2  [] 3+                                                                                                              Or                                                                                                             [] Assessment requiring an independent historian      4            [] One or more chronic illness with exacerbation,              [] Moderate                                                                      [x] Moderate                 Progression, or side effects of treatment                            -test documents or independent historians                        Prescription drug management                [x]  2 or more stable chronic illnesses                                    [x] Independent interpretation of tests                              Minor surgery with identifiable risk                [] 1 undiagnosed new problem with uncertain prognosis    [x] Discussion or management of test results                    elective major surgery                [] 1 acute illness with                systemic symptoms                                                                                                                                                              [] 1 acute complicated injury                                                                                                                                          Elective major surgery                                                                                                                                                                                                                                                                                                                                                                                                   5            [] 1 or more chronic illnesses with severe exacerbation,     [] Extensive(two from below)                                         [] High                                                                                                               [] Independent interpretation of results                         Drug therapy requiring intensive                                                                                                               []Discussion of management or test interpretation           monitoring                                                                                                                                                                                                       Decision to de-escalate care                 [] 1 acute or chronic illness or injury that poses a threat                                                                                               Decision regarding hospitalization                                                                                                                                                                                                               CC: Dr. Siegel

## 2023-09-21 LAB
CLAM IGE QN: 0.27 KU/L
CRAB IGE QN: 1.03 KU/L
DEPRECATED CLAM IGE RAST QL: ABNORMAL
DEPRECATED CRAB IGE RAST QL: ABNORMAL
DEPRECATED LOBSTER IGE RAST QL: ABNORMAL
DEPRECATED OYSTER IGE RAST QL: ABNORMAL
DEPRECATED SCALLOP IGE RAST QL: ABNORMAL
DEPRECATED SHRIMP IGE RAST QL: ABNORMAL
LOBSTER IGE QN: 0.94 KU/L
OYSTER IGE QN: 0.15 KU/L
SCALLOP IGE QN: 0.59 KU/L
SHRIMP IGE QN: 1.26 KU/L

## 2023-10-02 PROBLEM — Z00.00 ROUTINE GENERAL MEDICAL EXAMINATION AT A HEALTH CARE FACILITY: Status: RESOLVED | Noted: 2023-07-01 | Resolved: 2023-10-02

## 2023-10-19 PROBLEM — J30.81 ALLERGIC RHINITIS DUE TO CATS: Status: ACTIVE | Noted: 2023-10-19

## 2023-10-19 PROBLEM — Z88.8: Status: ACTIVE | Noted: 2023-10-19

## 2023-10-19 PROBLEM — J30.89 ALLERGIC RHINITIS DUE TO DERMATOPHAGOIDES PTERONYSSINUS: Status: ACTIVE | Noted: 2023-10-19

## 2023-10-23 ENCOUNTER — OFFICE VISIT (OUTPATIENT)
Dept: OPHTHALMOLOGY | Facility: CLINIC | Age: 33
End: 2023-10-23
Payer: COMMERCIAL

## 2023-10-23 DIAGNOSIS — R51.9 FREQUENT HEADACHES: Primary | ICD-10-CM

## 2023-10-23 PROCEDURE — 99213 PR OFFICE/OUTPT VISIT, EST, LEVL III, 20-29 MIN: ICD-10-PCS | Mod: S$GLB,,, | Performed by: OPTOMETRIST

## 2023-10-23 PROCEDURE — 92083 HUMPHREY VISUAL FIELD - OU - BOTH EYES: ICD-10-PCS | Mod: S$GLB,,, | Performed by: OPTOMETRIST

## 2023-10-23 PROCEDURE — 1159F MED LIST DOCD IN RCRD: CPT | Mod: CPTII,S$GLB,, | Performed by: OPTOMETRIST

## 2023-10-23 PROCEDURE — 92083 EXTENDED VISUAL FIELD XM: CPT | Mod: S$GLB,,, | Performed by: OPTOMETRIST

## 2023-10-23 PROCEDURE — 1159F PR MEDICATION LIST DOCUMENTED IN MEDICAL RECORD: ICD-10-PCS | Mod: CPTII,S$GLB,, | Performed by: OPTOMETRIST

## 2023-10-23 PROCEDURE — 99999 PR PBB SHADOW E&M-EST. PATIENT-LVL II: ICD-10-PCS | Mod: PBBFAC,,, | Performed by: OPTOMETRIST

## 2023-10-23 PROCEDURE — 99999 PR PBB SHADOW E&M-EST. PATIENT-LVL II: CPT | Mod: PBBFAC,,, | Performed by: OPTOMETRIST

## 2023-10-23 PROCEDURE — 1160F RVW MEDS BY RX/DR IN RCRD: CPT | Mod: CPTII,S$GLB,, | Performed by: OPTOMETRIST

## 2023-10-23 PROCEDURE — 1160F PR REVIEW ALL MEDS BY PRESCRIBER/CLIN PHARMACIST DOCUMENTED: ICD-10-PCS | Mod: CPTII,S$GLB,, | Performed by: OPTOMETRIST

## 2023-10-23 PROCEDURE — 99213 OFFICE O/P EST LOW 20 MIN: CPT | Mod: S$GLB,,, | Performed by: OPTOMETRIST

## 2023-10-23 NOTE — PROGRESS NOTES
HPI     Headache            Comments: Pt states he is having less vision issues. Pt is still having   migraines, no pain OU. Pt states he is struggling with new glasses, it   feel like its too magnified          Last edited by Patricia Morin MA on 10/23/2023  3:12 PM.            Assessment /Plan     For exam results, see Encounter Report.    Frequent headaches  -     Grover Visual Field - OU - Extended - Both Eyes    HVF 30-2 normal.   Not likely ocular in origin  Minimal refractive error  Normal, well-perfused optic nerves bilaterally with no edema  Continue care with PCP and/or specialists     RTC as scheduled for annual eye exam, sooner if any changes to vision worsening symptoms.

## 2023-11-13 ENCOUNTER — LAB VISIT (OUTPATIENT)
Dept: LAB | Facility: HOSPITAL | Age: 33
End: 2023-11-13
Attending: INTERNAL MEDICINE
Payer: COMMERCIAL

## 2023-11-13 ENCOUNTER — OFFICE VISIT (OUTPATIENT)
Dept: INTERNAL MEDICINE | Facility: CLINIC | Age: 33
End: 2023-11-13
Payer: COMMERCIAL

## 2023-11-13 ENCOUNTER — TELEPHONE (OUTPATIENT)
Dept: INTERNAL MEDICINE | Facility: CLINIC | Age: 33
End: 2023-11-13

## 2023-11-13 VITALS
OXYGEN SATURATION: 99 % | DIASTOLIC BLOOD PRESSURE: 88 MMHG | WEIGHT: 149.5 LBS | TEMPERATURE: 96 F | BODY MASS INDEX: 25.52 KG/M2 | SYSTOLIC BLOOD PRESSURE: 122 MMHG | HEIGHT: 64 IN | HEART RATE: 76 BPM

## 2023-11-13 DIAGNOSIS — F41.9 ANXIETY: Primary | ICD-10-CM

## 2023-11-13 DIAGNOSIS — R00.2 PALPITATION: ICD-10-CM

## 2023-11-13 DIAGNOSIS — G43.109 MIGRAINE WITH AURA AND WITHOUT STATUS MIGRAINOSUS, NOT INTRACTABLE: ICD-10-CM

## 2023-11-13 DIAGNOSIS — Z82.49 FAMILY HISTORY OF HEART DISEASE: ICD-10-CM

## 2023-11-13 PROCEDURE — 3074F SYST BP LT 130 MM HG: CPT | Mod: CPTII,S$GLB,, | Performed by: INTERNAL MEDICINE

## 2023-11-13 PROCEDURE — 3008F PR BODY MASS INDEX (BMI) DOCUMENTED: ICD-10-PCS | Mod: CPTII,S$GLB,, | Performed by: INTERNAL MEDICINE

## 2023-11-13 PROCEDURE — 83835 ASSAY OF METANEPHRINES: CPT | Performed by: INTERNAL MEDICINE

## 2023-11-13 PROCEDURE — 1159F PR MEDICATION LIST DOCUMENTED IN MEDICAL RECORD: ICD-10-PCS | Mod: CPTII,S$GLB,, | Performed by: INTERNAL MEDICINE

## 2023-11-13 PROCEDURE — 3079F PR MOST RECENT DIASTOLIC BLOOD PRESSURE 80-89 MM HG: ICD-10-PCS | Mod: CPTII,S$GLB,, | Performed by: INTERNAL MEDICINE

## 2023-11-13 PROCEDURE — 99999 PR PBB SHADOW E&M-EST. PATIENT-LVL IV: ICD-10-PCS | Mod: PBBFAC,,, | Performed by: INTERNAL MEDICINE

## 2023-11-13 PROCEDURE — 3074F PR MOST RECENT SYSTOLIC BLOOD PRESSURE < 130 MM HG: ICD-10-PCS | Mod: CPTII,S$GLB,, | Performed by: INTERNAL MEDICINE

## 2023-11-13 PROCEDURE — 36415 COLL VENOUS BLD VENIPUNCTURE: CPT | Performed by: INTERNAL MEDICINE

## 2023-11-13 PROCEDURE — 99214 PR OFFICE/OUTPT VISIT, EST, LEVL IV, 30-39 MIN: ICD-10-PCS | Mod: S$GLB,,, | Performed by: INTERNAL MEDICINE

## 2023-11-13 PROCEDURE — 99214 OFFICE O/P EST MOD 30 MIN: CPT | Mod: S$GLB,,, | Performed by: INTERNAL MEDICINE

## 2023-11-13 PROCEDURE — 1159F MED LIST DOCD IN RCRD: CPT | Mod: CPTII,S$GLB,, | Performed by: INTERNAL MEDICINE

## 2023-11-13 PROCEDURE — 3008F BODY MASS INDEX DOCD: CPT | Mod: CPTII,S$GLB,, | Performed by: INTERNAL MEDICINE

## 2023-11-13 PROCEDURE — 99999 PR PBB SHADOW E&M-EST. PATIENT-LVL IV: CPT | Mod: PBBFAC,,, | Performed by: INTERNAL MEDICINE

## 2023-11-13 PROCEDURE — 3079F DIAST BP 80-89 MM HG: CPT | Mod: CPTII,S$GLB,, | Performed by: INTERNAL MEDICINE

## 2023-11-13 RX ORDER — TOPIRAMATE 25 MG/1
25 TABLET ORAL NIGHTLY
Qty: 90 TABLET | Refills: 0 | Status: SHIPPED | OUTPATIENT
Start: 2023-11-13 | End: 2023-12-13 | Stop reason: SDUPTHER

## 2023-11-13 RX ORDER — BUSPIRONE HYDROCHLORIDE 10 MG/1
10 TABLET ORAL 2 TIMES DAILY
Qty: 180 TABLET | Refills: 0 | Status: SHIPPED | OUTPATIENT
Start: 2023-11-13 | End: 2024-02-05

## 2023-11-13 NOTE — PROGRESS NOTES
Subjective:      Patient ID: Caesar Abreu is a 32 y.o. male.    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 Continues about 3 per week. Did not take topomax for more than 3 days as his room mate through away bottle.  Migraines are typically light sensitivity followed by  HA. Fiorecet or excedrin help.     Taking hydroxyzine about 3 nights per week for anxiety. Describes anxiety as tension in neck and chest. Hydroxyzine helps but can cause drowsiness next morning.  These symptoms are almost always in the evenings. These symptoms have been symptoms have been present for several months. They are not usually association with palpitations. Not able to ID any particular stressors.       Also report h/o palpitations for 5 to 6 years. More frequent over the last year. Has had 3 episodes in last one month. Feels these episodes can precede neck tension and chest pressure. No assoc diaphoresis nor headache.           Review of Systems   Constitutional:  Negative for chills and fever.   HENT:  Negative for ear pain and sore throat.    Respiratory:  Negative for cough.    Cardiovascular:  Negative for chest pain.   Gastrointestinal: Negative.  Negative for abdominal pain and blood in stool.   Genitourinary:  Negative for dysuria and hematuria.   Skin:  Negative for rash and wound.   Neurological:  Positive for headaches. Negative for dizziness, seizures, syncope, facial asymmetry, speech difficulty and numbness.  "    Objective:   /88 (BP Location: Right arm, Patient Position: Sitting, BP Method: Large (Manual))   Pulse 76   Temp 96.4 °F (35.8 °C) (Tympanic)   Ht 5' 4" (1.626 m)   Wt 67.8 kg (149 lb 7.6 oz)   SpO2 99%   BMI 25.66 kg/m²     Physical Exam  Constitutional:       General: He is awake.      Appearance: Normal appearance.   HENT:      Head: Normocephalic and atraumatic.   Eyes:      Conjunctiva/sclera: Conjunctivae normal.   Pulmonary:      Effort: Pulmonary effort is normal.   Musculoskeletal:      Cervical back: Normal range of motion.   Neurological:      Mental Status: He is alert. Mental status is at baseline.   Psychiatric:         Mood and Affect: Mood normal.         Behavior: Behavior normal. Behavior is cooperative.         Thought Content: Thought content normal.         Judgment: Judgment normal.         Lab Results   Component Value Date    WBC 11.20 06/10/2023    HGB 15.0 06/10/2023    HCT 41.6 06/10/2023    MCV 87 06/10/2023     06/10/2023    CHOL 166 07/03/2023    TRIG 77 07/03/2023    HDL 45 07/03/2023    LDLCALC 105.6 07/03/2023    ALT 43 06/10/2023    AST 39 06/10/2023     06/10/2023    K 4.1 06/10/2023    CALCIUM 9.2 06/10/2023     06/10/2023    CO2 25 06/10/2023    BUN 16 06/10/2023    CREATININE 0.95 06/10/2023    EGFRNORACEVR >60 06/10/2023    TSH 2.21 06/10/2023     06/10/2023          The ASCVD Risk score (Eubank DK, et al., 2019) failed to calculate for the following reasons:    The 2019 ASCVD risk score is only valid for ages 40 to 79     Assessment:     1. Anxiety    2. Migraine with aura and without status migrainosus, not intractable    3. Family history of heart disease    4. Palpitation      Plan:   1. Anxiety  Assessment & Plan:  Not at goal. Add buspar.     Orders:  -     busPIRone (BUSPAR) 10 MG tablet; Take 1 tablet (10 mg total) by mouth 2 (two) times daily.  Dispense: 180 tablet; Refill: 0    2. Migraine with aura and without status " migrainosus, not intractable  -     topiramate (TOPAMAX) 25 MG tablet; Take 1 tablet (25 mg total) by mouth every evening.  Dispense: 90 tablet; Refill: 0    3. Family history of heart disease  Overview:  MGF- heart disease in his 50s., Maternal uncle with cad in his 40s.       4. Palpitation  -     Ambulatory referral/consult to Cardiology; Future; Expected date: 11/20/2023  -     METANEPHRINES, PLASMA FREE; Future; Expected date: 11/13/2023  -     Cancel: 5 HIAA, quantitative, Urine; Future        There are no Patient Instructions on file for this visit.    No future appointments.          Follow up in about 4 months (around 3/13/2024), or if symptoms worsen or fail to improve.              hair removal not indicated

## 2023-11-14 ENCOUNTER — OFFICE VISIT (OUTPATIENT)
Dept: CARDIOLOGY | Facility: CLINIC | Age: 33
End: 2023-11-14
Payer: COMMERCIAL

## 2023-11-14 ENCOUNTER — LAB VISIT (OUTPATIENT)
Dept: LAB | Facility: HOSPITAL | Age: 33
End: 2023-11-14
Attending: INTERNAL MEDICINE
Payer: COMMERCIAL

## 2023-11-14 VITALS
BODY MASS INDEX: 25.56 KG/M2 | WEIGHT: 149.69 LBS | HEIGHT: 64 IN | OXYGEN SATURATION: 99 % | DIASTOLIC BLOOD PRESSURE: 84 MMHG | SYSTOLIC BLOOD PRESSURE: 124 MMHG | HEART RATE: 62 BPM

## 2023-11-14 DIAGNOSIS — R00.2 PALPITATION: Primary | ICD-10-CM

## 2023-11-14 DIAGNOSIS — I47.9 TACHYCARDIA, PAROXYSMAL: ICD-10-CM

## 2023-11-14 DIAGNOSIS — R00.2 PALPITATION: ICD-10-CM

## 2023-11-14 DIAGNOSIS — Z82.49 FAMILY HISTORY OF HEART DISEASE: ICD-10-CM

## 2023-11-14 DIAGNOSIS — I10 PRIMARY HYPERTENSION: ICD-10-CM

## 2023-11-14 PROCEDURE — 82088 ASSAY OF ALDOSTERONE: CPT | Performed by: INTERNAL MEDICINE

## 2023-11-14 PROCEDURE — 1159F PR MEDICATION LIST DOCUMENTED IN MEDICAL RECORD: ICD-10-PCS | Mod: CPTII,S$GLB,, | Performed by: INTERNAL MEDICINE

## 2023-11-14 PROCEDURE — 99204 OFFICE O/P NEW MOD 45 MIN: CPT | Mod: S$GLB,,, | Performed by: INTERNAL MEDICINE

## 2023-11-14 PROCEDURE — 1159F MED LIST DOCD IN RCRD: CPT | Mod: CPTII,S$GLB,, | Performed by: INTERNAL MEDICINE

## 2023-11-14 PROCEDURE — 1160F RVW MEDS BY RX/DR IN RCRD: CPT | Mod: CPTII,S$GLB,, | Performed by: INTERNAL MEDICINE

## 2023-11-14 PROCEDURE — 3074F PR MOST RECENT SYSTOLIC BLOOD PRESSURE < 130 MM HG: ICD-10-PCS | Mod: CPTII,S$GLB,, | Performed by: INTERNAL MEDICINE

## 2023-11-14 PROCEDURE — 3079F DIAST BP 80-89 MM HG: CPT | Mod: CPTII,S$GLB,, | Performed by: INTERNAL MEDICINE

## 2023-11-14 PROCEDURE — 84244 ASSAY OF RENIN: CPT | Performed by: INTERNAL MEDICINE

## 2023-11-14 PROCEDURE — 36415 COLL VENOUS BLD VENIPUNCTURE: CPT | Performed by: INTERNAL MEDICINE

## 2023-11-14 PROCEDURE — 99999 PR PBB SHADOW E&M-EST. PATIENT-LVL III: ICD-10-PCS | Mod: PBBFAC,,, | Performed by: INTERNAL MEDICINE

## 2023-11-14 PROCEDURE — 3008F BODY MASS INDEX DOCD: CPT | Mod: CPTII,S$GLB,, | Performed by: INTERNAL MEDICINE

## 2023-11-14 PROCEDURE — 99204 PR OFFICE/OUTPT VISIT, NEW, LEVL IV, 45-59 MIN: ICD-10-PCS | Mod: S$GLB,,, | Performed by: INTERNAL MEDICINE

## 2023-11-14 PROCEDURE — 3008F PR BODY MASS INDEX (BMI) DOCUMENTED: ICD-10-PCS | Mod: CPTII,S$GLB,, | Performed by: INTERNAL MEDICINE

## 2023-11-14 PROCEDURE — 3079F PR MOST RECENT DIASTOLIC BLOOD PRESSURE 80-89 MM HG: ICD-10-PCS | Mod: CPTII,S$GLB,, | Performed by: INTERNAL MEDICINE

## 2023-11-14 PROCEDURE — 1160F PR REVIEW ALL MEDS BY PRESCRIBER/CLIN PHARMACIST DOCUMENTED: ICD-10-PCS | Mod: CPTII,S$GLB,, | Performed by: INTERNAL MEDICINE

## 2023-11-14 PROCEDURE — 3074F SYST BP LT 130 MM HG: CPT | Mod: CPTII,S$GLB,, | Performed by: INTERNAL MEDICINE

## 2023-11-14 PROCEDURE — 99999 PR PBB SHADOW E&M-EST. PATIENT-LVL III: CPT | Mod: PBBFAC,,, | Performed by: INTERNAL MEDICINE

## 2023-11-14 NOTE — PROGRESS NOTES
Subjective:   Patient ID:  Caesar Abreu is a 32 y.o. male who presents for evaluation of No chief complaint on file.      33 yo male, referred by Dr. Siegel for palpitation.  PMH anxiety. Controlled by rx  H/o intermittent /110.  Episodes of palpitation faint lightheadedness. Few times a month, lasted for minutes. Pulse 180 bpm and SOB. Randomly occurred. Some chest tightness and flushing.  Exercise regularly. Coffee in AM. Occasionally drinking  Occasional palpitation for yrs, worse now  Mother side grandfather and uncle heart attack and stroke at age if 50's  Lab metanephrine pending  EKG NSR        No results found for this or any previous visit.     No results found for this or any previous visit.       Past Medical History:   Diagnosis Date    History of chicken pox        Past Surgical History:   Procedure Laterality Date    ADENOIDECTOMY      arm fracture Right     FRACTURE SURGERY  2007    Right arm    TYMPANOSTOMY TUBE PLACEMENT         Social History     Tobacco Use    Smoking status: Never    Smokeless tobacco: Never   Substance Use Topics    Alcohol use: Yes     Alcohol/week: 2.0 standard drinks of alcohol     Types: 2 Shots of liquor per week     Comment: twice a week     Drug use: Never       Family History   Problem Relation Age of Onset    No Known Problems Mother     Cancer Father         prostate    Cancer Maternal Grandmother         breast     Diabetes Maternal Grandmother     Hearing loss Maternal Grandmother     Stroke Maternal Grandmother     Cancer Maternal Grandfather     COPD Maternal Grandfather        Review of Systems   Constitutional: Negative for decreased appetite, diaphoresis, fever, malaise/fatigue and night sweats.   HENT:  Negative for nosebleeds.    Eyes:  Negative for blurred vision and double vision.   Cardiovascular:  Positive for chest pain, dyspnea on exertion and palpitations. Negative for claudication, irregular heartbeat, leg swelling, near-syncope, orthopnea,  paroxysmal nocturnal dyspnea and syncope.   Respiratory:  Negative for cough, shortness of breath, sleep disturbances due to breathing, snoring, sputum production and wheezing.    Endocrine: Negative for cold intolerance and polyuria.   Hematologic/Lymphatic: Does not bruise/bleed easily.   Skin:  Negative for rash.   Musculoskeletal:  Negative for back pain, falls, joint pain, joint swelling and neck pain.   Gastrointestinal:  Negative for abdominal pain, heartburn, nausea and vomiting.   Genitourinary:  Negative for dysuria, frequency and hematuria.   Neurological:  Positive for dizziness and light-headedness. Negative for difficulty with concentration, focal weakness, headaches, numbness, seizures and weakness.   Psychiatric/Behavioral:  Negative for depression, memory loss and substance abuse. The patient does not have insomnia.    Allergic/Immunologic: Negative for HIV exposure and hives.       Objective:   Physical Exam  HENT:      Head: Normocephalic.   Eyes:      Pupils: Pupils are equal, round, and reactive to light.   Neck:      Thyroid: No thyromegaly.      Vascular: Normal carotid pulses. No carotid bruit or JVD.   Cardiovascular:      Rate and Rhythm: Normal rate and regular rhythm. No extrasystoles are present.     Chest Wall: PMI is not displaced.      Pulses: Normal pulses.      Heart sounds: Normal heart sounds. No murmur heard.     No gallop. No S3 sounds.   Pulmonary:      Effort: No respiratory distress.      Breath sounds: Normal breath sounds. No stridor.   Abdominal:      General: Bowel sounds are normal.      Palpations: Abdomen is soft.      Tenderness: There is no abdominal tenderness. There is no rebound.   Musculoskeletal:         General: Normal range of motion.   Skin:     Findings: No rash.   Neurological:      Mental Status: He is alert and oriented to person, place, and time.   Psychiatric:         Behavior: Behavior normal.         Lab Results   Component Value Date    CHOL 166  "07/03/2023     Lab Results   Component Value Date    HDL 45 07/03/2023     Lab Results   Component Value Date    LDLCALC 105.6 07/03/2023     Lab Results   Component Value Date    TRIG 77 07/03/2023     Lab Results   Component Value Date    CHOLHDL 27.1 07/03/2023       Chemistry        Component Value Date/Time     06/10/2023 0019    K 4.1 06/10/2023 0019     06/10/2023 0019    CO2 25 06/10/2023 0019    BUN 16 06/10/2023 0019    CREATININE 0.95 06/10/2023 0019     06/10/2023 0019        Component Value Date/Time    CALCIUM 9.2 06/10/2023 0019    ALKPHOS 63 06/10/2023 0019    AST 39 06/10/2023 0019    ALT 43 06/10/2023 0019    BILITOT 1.5 (H) 06/10/2023 0019          No results found for: "LABA1C", "HGBA1C"  Lab Results   Component Value Date    TSH 2.21 06/10/2023     No results found for: "INR", "PROTIME"  Lab Results   Component Value Date    WBC 11.20 06/10/2023    HGB 15.0 06/10/2023    HCT 41.6 06/10/2023    MCV 87 06/10/2023     06/10/2023     BNP  @LABRCNTIP(BNP,BNPTRIAGEBLO)@  CrCl cannot be calculated (Patient's most recent lab result is older than the maximum 7 days allowed.).  No results found in the last 24 hours.  No results found in the last 24 hours.  No results found in the last 24 hours.    Assessment:      1. Palpitation    2. Primary hypertension    3. Tachycardia, paroxysmal    4. Family history of heart disease        Plan:   Palpitation  -     Ambulatory referral/consult to Cardiology  -     Cardiac Monitor - 3-15 Day Adult (Cupid Only); Future  -     Echo; Future  -     Aldosterone/Renin Activity Ratio; Future; Expected date: 11/14/2023    Primary hypertension    Tachycardia, paroxysmal    Family history of heart disease      R/o 2nd HTN  Echo and BARDY for palpitation  Phone review          "

## 2023-11-17 LAB
ALDOST SERPL-MCNC: 7.3 NG/DL
ALDOST/RENIN PLAS-RTO: 3.5 RATIO
RENIN PLAS-CCNC: 2.1 NG/ML/HR

## 2023-11-21 ENCOUNTER — HOSPITAL ENCOUNTER (OUTPATIENT)
Dept: CARDIOLOGY | Facility: HOSPITAL | Age: 33
Discharge: HOME | End: 2023-11-21
Attending: INTERNAL MEDICINE
Payer: COMMERCIAL

## 2023-11-21 VITALS
DIASTOLIC BLOOD PRESSURE: 84 MMHG | BODY MASS INDEX: 25.44 KG/M2 | HEIGHT: 64 IN | SYSTOLIC BLOOD PRESSURE: 124 MMHG | WEIGHT: 149 LBS

## 2023-11-21 DIAGNOSIS — R00.2 PALPITATION: ICD-10-CM

## 2023-11-21 LAB
AORTIC ROOT ANNULUS: 2.95 CM
ASCENDING AORTA: 2.86 CM
AV INDEX (PROSTH): 0.85
AV MEAN GRADIENT: 3 MMHG
AV PEAK GRADIENT: 5 MMHG
AV VALVE AREA BY VELOCITY RATIO: 2.79 CM²
AV VALVE AREA: 2.73 CM²
AV VELOCITY RATIO: 0.87
BSA FOR ECHO PROCEDURE: 1.75 M2
CV ECHO LV RWT: 0.39 CM
DOP CALC AO PEAK VEL: 1.16 M/S
DOP CALC AO VTI: 22.8 CM
DOP CALC LVOT AREA: 3.2 CM2
DOP CALC LVOT DIAMETER: 2.02 CM
DOP CALC LVOT PEAK VEL: 1.01 M/S
DOP CALC LVOT STROKE VOLUME: 62.14 CM3
DOP CALC RVOT PEAK VEL: 0.91 M/S
DOP CALC RVOT VTI: 16.4 CM
DOP CALCLVOT PEAK VEL VTI: 19.4 CM
E WAVE DECELERATION TIME: 221.79 MSEC
E/A RATIO: 1.19
E/E' RATIO: 6.17 M/S
ECHO LV POSTERIOR WALL: 0.95 CM (ref 0.6–1.1)
FRACTIONAL SHORTENING: 38 % (ref 28–44)
INTERVENTRICULAR SEPTUM: 0.98 CM (ref 0.6–1.1)
IVC DIAMETER: 1.42 CM
IVRT: 98.95 MSEC
LA MAJOR: 5.02 CM
LA MINOR: 4.68 CM
LA WIDTH: 2.9 CM
LEFT ATRIUM SIZE: 3.31 CM
LEFT ATRIUM VOLUME INDEX MOD: 21.1 ML/M2
LEFT ATRIUM VOLUME INDEX: 22.8 ML/M2
LEFT ATRIUM VOLUME MOD: 36.45 CM3
LEFT ATRIUM VOLUME: 39.52 CM3
LEFT INTERNAL DIMENSION IN SYSTOLE: 2.98 CM (ref 2.1–4)
LEFT VENTRICLE DIASTOLIC VOLUME INDEX: 62.92 ML/M2
LEFT VENTRICLE DIASTOLIC VOLUME: 108.86 ML
LEFT VENTRICLE MASS INDEX: 95 G/M2
LEFT VENTRICLE SYSTOLIC VOLUME INDEX: 19.9 ML/M2
LEFT VENTRICLE SYSTOLIC VOLUME: 34.44 ML
LEFT VENTRICULAR INTERNAL DIMENSION IN DIASTOLE: 4.83 CM (ref 3.5–6)
LEFT VENTRICULAR MASS: 163.87 G
LV LATERAL E/E' RATIO: 4.93 M/S
LV SEPTAL E/E' RATIO: 8.22 M/S
LVOT MG: 2.24 MMHG
LVOT MV: 0.69 CM/S
MV PEAK A VEL: 0.62 M/S
MV PEAK E VEL: 0.74 M/S
PISA TR MAX VEL: 1.85 M/S
PULM VEIN S/D RATIO: 0.69
PV MEAN GRADIENT: 2 MMHG
PV PEAK D VEL: 0.64 M/S
PV PEAK GRADIENT: 4 MMHG
PV PEAK S VEL: 0.44 M/S
PV PEAK VELOCITY: 0.97 M/S
RA MAJOR: 4.74 CM
RA PRESSURE ESTIMATED: 3 MMHG
RA WIDTH: 3.19 CM
RIGHT VENTRICULAR END-DIASTOLIC DIMENSION: 2.77 CM
RV TB RVSP: 5 MMHG
SINUS: 3.26 CM
STJ: 2.87 CM
TDI LATERAL: 0.15 M/S
TDI SEPTAL: 0.09 M/S
TDI: 0.12 M/S
TR MAX PG: 14 MMHG
TRICUSPID ANNULAR PLANE SYSTOLIC EXCURSION: 1.9 CM
TV REST PULMONARY ARTERY PRESSURE: 17 MMHG
Z-SCORE OF LEFT VENTRICULAR DIMENSION IN END DIASTOLE: 0.08
Z-SCORE OF LEFT VENTRICULAR DIMENSION IN END SYSTOLE: 0.04

## 2023-11-21 PROCEDURE — 93306 TTE W/DOPPLER COMPLETE: CPT | Mod: 26,,, | Performed by: INTERNAL MEDICINE

## 2023-11-21 PROCEDURE — 93306 ECHO (CUPID ONLY): ICD-10-PCS | Mod: 26,,, | Performed by: INTERNAL MEDICINE

## 2023-11-21 PROCEDURE — 93248 EXT ECG>7D<15D REV&INTERPJ: CPT | Mod: ,,, | Performed by: INTERNAL MEDICINE

## 2023-11-21 PROCEDURE — 93248 CV CARDIAC MONITOR - 3-15 DAY ADULT (CUPID ONLY): ICD-10-PCS | Mod: ,,, | Performed by: INTERNAL MEDICINE

## 2023-11-21 PROCEDURE — 93306 TTE W/DOPPLER COMPLETE: CPT

## 2023-11-22 ENCOUNTER — TELEPHONE (OUTPATIENT)
Dept: CARDIOLOGY | Facility: CLINIC | Age: 33
End: 2023-11-22
Payer: COMMERCIAL

## 2023-11-22 NOTE — TELEPHONE ENCOUNTER
Echo showed normal function and valvular structures     The patient has been notified of this information and all questions answered.       ----- Message from Lucía Nuno sent at 11/22/2023 12:32 PM CST -----  Contact: Caesar  .Type:  Patient Returning Call    Who Called: Caesar  Who Left Message for Patient: Kate  Does the patient know what this is regarding?: Yes  Would the patient rather a call back or a response via [facility]?  Call back  Best Call Back Number:  [phone]  Additional Information:      Thanks

## 2023-11-22 NOTE — TELEPHONE ENCOUNTER
LVM for pt to call back in regards to echo results.           ----- Message from Praful Murphy MD sent at 11/22/2023  8:51 AM CST -----  Echo showed normal function and valvular structures

## 2023-11-29 LAB
METANEPH FREE SERPL-MCNC: 39 PG/ML
METANEPHS SERPL-MCNC: 119 PG/ML
NORMETANEPH FREE SERPL-MCNC: 80 PG/ML

## 2023-12-07 LAB
OHS CV HOLTER SINUS AVERAGE HR: 67
OHS CV HOLTER SINUS MAX HR: 156
OHS CV HOLTER SINUS MIN HR: 43

## 2023-12-12 NOTE — PROGRESS NOTES
"Subjective:      Patient ID: Caesar Abreu is a 33 y.o. male.    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  Compliant with meds without significant side effects. Energy and appetite good.     Buspirone causes dizziness.  When taken as prevention.  Advised to try just as PRN.    No recent panic attacks.     Bp has been great.     No migraines since starting topiramate.         Review of Systems   Constitutional:  Negative for chills and fever.   HENT:  Negative for ear pain and sore throat.    Respiratory:  Negative for cough.    Cardiovascular:  Negative for chest pain.   Gastrointestinal:  Negative for abdominal pain and blood in stool.   Genitourinary:  Negative for dysuria and hematuria.   Neurological:  Negative for seizures and syncope.     Objective:   /80 (BP Location: Left arm, Patient Position: Sitting, BP Method: Large (Manual))   Pulse 63   Temp 97.3 °F (36.3 °C) (Tympanic)   Ht 5' 4.02" (1.626 m)   Wt 67.4 kg (148 lb 9.4 oz)   SpO2 99%   BMI 25.49 kg/m²     Physical Exam  Constitutional:       General: He is awake. He is not in acute distress.     Appearance: Normal appearance. He is well-developed.   HENT:      Head: Normocephalic and atraumatic.   Eyes:      Conjunctiva/sclera: Conjunctivae normal.   Cardiovascular:      Rate and Rhythm: Normal rate.   Pulmonary:      Effort: Pulmonary effort is normal.      Breath sounds: Normal breath sounds.   Musculoskeletal:      " Cervical back: Normal range of motion.   Skin:     General: Skin is warm and dry.   Neurological:      Mental Status: He is alert. Mental status is at baseline.   Psychiatric:         Mood and Affect: Mood normal.         Behavior: Behavior normal. Behavior is cooperative.         Thought Content: Thought content normal.         Judgment: Judgment normal.         Lab Results   Component Value Date    WBC 11.20 06/10/2023    HGB 15.0 06/10/2023    HCT 41.6 06/10/2023    MCV 87 06/10/2023     06/10/2023    CHOL 166 07/03/2023    TRIG 77 07/03/2023    HDL 45 07/03/2023    LDLCALC 105.6 07/03/2023    ALT 43 06/10/2023    AST 39 06/10/2023     06/10/2023    K 4.1 06/10/2023    CALCIUM 9.2 06/10/2023     06/10/2023    CO2 25 06/10/2023    BUN 16 06/10/2023    CREATININE 0.95 06/10/2023    EGFRNORACEVR >60 06/10/2023    TSH 2.21 06/10/2023     06/10/2023          The ASCVD Risk score (Fred DK, et al., 2019) failed to calculate for the following reasons:    The 2019 ASCVD risk score is only valid for ages 40 to 79     Assessment:     1. Anxiety    2. Migraine with aura and without status migrainosus, not intractable    3. Elevated blood pressure reading      Plan:   1. Anxiety  Overview:  Much improved since HAs controlled  Has buspar and hydroxyzine for prn use.       2. Migraine with aura and without status migrainosus, not intractable  -     topiramate (TOPAMAX) 25 MG tablet; Take 1 tablet (25 mg total) by mouth every evening.  Dispense: 90 tablet; Refill: 3    3. Elevated blood pressure reading  Overview:  Improved with tx of anxiety and migraines.           Patient Instructions   Check with your pharmacy regarding flu and covid vaccine.     No future appointments.          Follow up in about 6 months (around 6/13/2024).

## 2023-12-13 ENCOUNTER — OFFICE VISIT (OUTPATIENT)
Dept: INTERNAL MEDICINE | Facility: CLINIC | Age: 33
End: 2023-12-13
Payer: COMMERCIAL

## 2023-12-13 VITALS
OXYGEN SATURATION: 99 % | WEIGHT: 148.56 LBS | DIASTOLIC BLOOD PRESSURE: 80 MMHG | BODY MASS INDEX: 25.36 KG/M2 | HEIGHT: 64 IN | TEMPERATURE: 97 F | HEART RATE: 63 BPM | SYSTOLIC BLOOD PRESSURE: 118 MMHG

## 2023-12-13 DIAGNOSIS — F41.9 ANXIETY: Primary | ICD-10-CM

## 2023-12-13 DIAGNOSIS — R03.0 ELEVATED BLOOD PRESSURE READING: ICD-10-CM

## 2023-12-13 DIAGNOSIS — G43.109 MIGRAINE WITH AURA AND WITHOUT STATUS MIGRAINOSUS, NOT INTRACTABLE: ICD-10-CM

## 2023-12-13 PROBLEM — I10 PRIMARY HYPERTENSION: Status: RESOLVED | Noted: 2023-11-14 | Resolved: 2023-12-13

## 2023-12-13 PROCEDURE — 3079F PR MOST RECENT DIASTOLIC BLOOD PRESSURE 80-89 MM HG: ICD-10-PCS | Mod: CPTII,S$GLB,, | Performed by: INTERNAL MEDICINE

## 2023-12-13 PROCEDURE — 99214 OFFICE O/P EST MOD 30 MIN: CPT | Mod: S$GLB,,, | Performed by: INTERNAL MEDICINE

## 2023-12-13 PROCEDURE — 3074F PR MOST RECENT SYSTOLIC BLOOD PRESSURE < 130 MM HG: ICD-10-PCS | Mod: CPTII,S$GLB,, | Performed by: INTERNAL MEDICINE

## 2023-12-13 PROCEDURE — 1159F MED LIST DOCD IN RCRD: CPT | Mod: CPTII,S$GLB,, | Performed by: INTERNAL MEDICINE

## 2023-12-13 PROCEDURE — 1159F PR MEDICATION LIST DOCUMENTED IN MEDICAL RECORD: ICD-10-PCS | Mod: CPTII,S$GLB,, | Performed by: INTERNAL MEDICINE

## 2023-12-13 PROCEDURE — 3008F BODY MASS INDEX DOCD: CPT | Mod: CPTII,S$GLB,, | Performed by: INTERNAL MEDICINE

## 2023-12-13 PROCEDURE — 99214 PR OFFICE/OUTPT VISIT, EST, LEVL IV, 30-39 MIN: ICD-10-PCS | Mod: S$GLB,,, | Performed by: INTERNAL MEDICINE

## 2023-12-13 PROCEDURE — 99999 PR PBB SHADOW E&M-EST. PATIENT-LVL III: ICD-10-PCS | Mod: PBBFAC,,, | Performed by: INTERNAL MEDICINE

## 2023-12-13 PROCEDURE — 3079F DIAST BP 80-89 MM HG: CPT | Mod: CPTII,S$GLB,, | Performed by: INTERNAL MEDICINE

## 2023-12-13 PROCEDURE — 99999 PR PBB SHADOW E&M-EST. PATIENT-LVL III: CPT | Mod: PBBFAC,,, | Performed by: INTERNAL MEDICINE

## 2023-12-13 PROCEDURE — 3074F SYST BP LT 130 MM HG: CPT | Mod: CPTII,S$GLB,, | Performed by: INTERNAL MEDICINE

## 2023-12-13 PROCEDURE — 3008F PR BODY MASS INDEX (BMI) DOCUMENTED: ICD-10-PCS | Mod: CPTII,S$GLB,, | Performed by: INTERNAL MEDICINE

## 2023-12-13 RX ORDER — TOPIRAMATE 25 MG/1
25 TABLET ORAL NIGHTLY
Qty: 90 TABLET | Refills: 3 | Status: SHIPPED | OUTPATIENT
Start: 2023-12-13

## 2024-02-04 DIAGNOSIS — F41.9 ANXIETY: ICD-10-CM

## 2024-02-04 NOTE — TELEPHONE ENCOUNTER
No care due was identified.  [facility] Embedded Care Due Messages. Reference number: [id number].   2/04/2024 7:31:25 AM CST

## 2024-02-05 RX ORDER — BUSPIRONE HYDROCHLORIDE 10 MG/1
10 TABLET ORAL 2 TIMES DAILY
Qty: 180 TABLET | Refills: 0 | Status: SHIPPED | OUTPATIENT
Start: 2024-02-05

## 2024-05-31 ENCOUNTER — PATIENT MESSAGE (OUTPATIENT)
Dept: RESEARCH | Facility: HOSPITAL | Age: 34
End: 2024-05-31
Payer: COMMERCIAL

## 2024-06-19 DIAGNOSIS — I10 PRIMARY HYPERTENSION: ICD-10-CM

## 2024-06-25 ENCOUNTER — OFFICE VISIT (OUTPATIENT)
Dept: INTERNAL MEDICINE | Facility: CLINIC | Age: 34
End: 2024-06-25
Payer: COMMERCIAL

## 2024-06-25 ENCOUNTER — LAB VISIT (OUTPATIENT)
Dept: LAB | Facility: HOSPITAL | Age: 34
End: 2024-06-25
Attending: INTERNAL MEDICINE
Payer: COMMERCIAL

## 2024-06-25 VITALS
SYSTOLIC BLOOD PRESSURE: 124 MMHG | HEIGHT: 64 IN | BODY MASS INDEX: 25.21 KG/M2 | DIASTOLIC BLOOD PRESSURE: 80 MMHG | TEMPERATURE: 97 F | WEIGHT: 147.69 LBS | HEART RATE: 72 BPM | OXYGEN SATURATION: 97 %

## 2024-06-25 DIAGNOSIS — J30.89 ALLERGIC RHINITIS DUE TO DERMATOPHAGOIDES PTERONYSSINUS: ICD-10-CM

## 2024-06-25 DIAGNOSIS — Z00.00 ROUTINE GENERAL MEDICAL EXAMINATION AT A HEALTH CARE FACILITY: ICD-10-CM

## 2024-06-25 DIAGNOSIS — J30.81 ALLERGIC RHINITIS DUE TO CATS: ICD-10-CM

## 2024-06-25 DIAGNOSIS — G43.109 MIGRAINE WITH AURA AND WITHOUT STATUS MIGRAINOSUS, NOT INTRACTABLE: ICD-10-CM

## 2024-06-25 DIAGNOSIS — Z00.00 ROUTINE GENERAL MEDICAL EXAMINATION AT A HEALTH CARE FACILITY: Primary | ICD-10-CM

## 2024-06-25 DIAGNOSIS — Z91.018 FOOD ALLERGY: ICD-10-CM

## 2024-06-25 DIAGNOSIS — F41.9 ANXIETY: ICD-10-CM

## 2024-06-25 LAB
ESTIMATED AVG GLUCOSE: 100 MG/DL (ref 68–131)
HBA1C MFR BLD: 5.1 % (ref 4–5.6)

## 2024-06-25 PROCEDURE — 3079F DIAST BP 80-89 MM HG: CPT | Mod: CPTII,S$GLB,, | Performed by: INTERNAL MEDICINE

## 2024-06-25 PROCEDURE — 80053 COMPREHEN METABOLIC PANEL: CPT | Performed by: INTERNAL MEDICINE

## 2024-06-25 PROCEDURE — 3074F SYST BP LT 130 MM HG: CPT | Mod: CPTII,S$GLB,, | Performed by: INTERNAL MEDICINE

## 2024-06-25 PROCEDURE — 1159F MED LIST DOCD IN RCRD: CPT | Mod: CPTII,S$GLB,, | Performed by: INTERNAL MEDICINE

## 2024-06-25 PROCEDURE — 36415 COLL VENOUS BLD VENIPUNCTURE: CPT | Performed by: INTERNAL MEDICINE

## 2024-06-25 PROCEDURE — 83036 HEMOGLOBIN GLYCOSYLATED A1C: CPT | Performed by: INTERNAL MEDICINE

## 2024-06-25 PROCEDURE — 99213 OFFICE O/P EST LOW 20 MIN: CPT | Mod: 25,S$GLB,, | Performed by: INTERNAL MEDICINE

## 2024-06-25 PROCEDURE — 3008F BODY MASS INDEX DOCD: CPT | Mod: CPTII,S$GLB,, | Performed by: INTERNAL MEDICINE

## 2024-06-25 PROCEDURE — 99395 PREV VISIT EST AGE 18-39: CPT | Mod: S$GLB,,, | Performed by: INTERNAL MEDICINE

## 2024-06-25 PROCEDURE — 84443 ASSAY THYROID STIM HORMONE: CPT | Performed by: INTERNAL MEDICINE

## 2024-06-25 PROCEDURE — 85025 COMPLETE CBC W/AUTO DIFF WBC: CPT | Performed by: INTERNAL MEDICINE

## 2024-06-25 PROCEDURE — 99999 PR PBB SHADOW E&M-EST. PATIENT-LVL IV: CPT | Mod: PBBFAC,,, | Performed by: INTERNAL MEDICINE

## 2024-06-25 RX ORDER — BUSPIRONE HYDROCHLORIDE 5 MG/1
5 TABLET ORAL 2 TIMES DAILY
Qty: 180 TABLET | Refills: 2 | Status: SHIPPED | OUTPATIENT
Start: 2024-06-25 | End: 2025-06-25

## 2024-06-25 RX ORDER — LEVOCETIRIZINE DIHYDROCHLORIDE 5 MG/1
5 TABLET, FILM COATED ORAL NIGHTLY
Qty: 30 TABLET | Refills: 11 | Status: SHIPPED | OUTPATIENT
Start: 2024-06-25 | End: 2025-06-25

## 2024-06-25 RX ORDER — EPINEPHRINE 0.3 MG/.3ML
1 INJECTION SUBCUTANEOUS ONCE
Qty: 2 EACH | Refills: 3 | Status: SHIPPED | OUTPATIENT
Start: 2024-06-25 | End: 2024-06-25

## 2024-06-25 NOTE — PROGRESS NOTES
Subjective:      Patient ID: Caesar Abreu is a 33 y.o. male.    Chief Complaint: Follow-up    Follow-up  Pertinent negatives include no abdominal pain, chest pain, chills, coughing, fever or sore throat.         33 y.o. 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  Compliant with meds. Energy and appetite are good.     Topiramate controlling migraines well as long as takes daily.     Describes anxiety as tension in neck and chest. Hydroxyzine helps but can cause drowsiness.      Buspirone takes 2 to 3 hours to get effect. Does not like 10mg as it makes him feel funny.     Has had 4 to 5 episodes in past 3 weeks.     Denies depression.           Past Surgical History:   Procedure Laterality Date    ADENOIDECTOMY      arm fracture Right     FRACTURE SURGERY  2007    Right arm    TYMPANOSTOMY TUBE PLACEMENT       Social History     Socioeconomic History    Marital status:    Tobacco Use    Smoking status: Never    Smokeless tobacco: Never   Substance and Sexual Activity    Alcohol use: Yes     Alcohol/week: 2.0 standard drinks of alcohol     Types: 2 Shots of liquor per week     Comment: twice a week     Drug use: Never    Sexual activity: Yes     Partners: Female     Birth control/protection: Inserts     family history includes COPD in his maternal grandfather; Cancer in his father, maternal grandfather, and maternal grandmother; Diabetes in his maternal  "grandmother; Hearing loss in his maternal grandmother; No Known Problems in his mother; Stroke in his maternal grandmother.  Review of Systems   Constitutional:  Negative for chills and fever.   HENT:  Negative for ear pain and sore throat.    Respiratory:  Negative for cough.    Cardiovascular:  Negative for chest pain.   Gastrointestinal:  Negative for abdominal pain and blood in stool.   Genitourinary:  Negative for dysuria and hematuria.   Neurological:  Negative for seizures and syncope.     Objective:   /80 (BP Location: Right arm, Patient Position: Sitting, BP Method: Large (Manual))   Pulse 72   Temp 97.3 °F (36.3 °C) (Tympanic)   Ht 5' 4" (1.626 m)   Wt 67 kg (147 lb 11.3 oz)   SpO2 97%   BMI 25.35 kg/m²     Physical Exam  Constitutional:       General: He is not in acute distress.     Appearance: He is well-developed.   HENT:      Head: Normocephalic and atraumatic.   Eyes:      Extraocular Movements: Extraocular movements intact.   Neck:      Thyroid: No thyromegaly.   Cardiovascular:      Rate and Rhythm: Normal rate and regular rhythm.   Pulmonary:      Breath sounds: Normal breath sounds. No wheezing or rales.   Abdominal:      General: Bowel sounds are normal.      Palpations: Abdomen is soft.      Tenderness: There is no abdominal tenderness.   Musculoskeletal:         General: No swelling.      Cervical back: Neck supple. No rigidity.   Lymphadenopathy:      Cervical: No cervical adenopathy.   Skin:     General: Skin is warm and dry.   Neurological:      Mental Status: He is alert and oriented to person, place, and time.   Psychiatric:         Behavior: Behavior normal.         Lab Results   Component Value Date    WBC 11.20 06/10/2023    HGB 15.0 06/10/2023    HCT 41.6 06/10/2023    MCV 87 06/10/2023     06/10/2023    CHOL 166 07/03/2023    TRIG 77 07/03/2023    HDL 45 07/03/2023    LDLCALC 105.6 07/03/2023    ALT 43 06/10/2023    AST 39 06/10/2023     06/10/2023    K 4.1 " 06/10/2023    CALCIUM 9.2 06/10/2023     06/10/2023    CO2 25 06/10/2023    BUN 16 06/10/2023    CREATININE 0.95 06/10/2023    EGFRNORACEVR >60 06/10/2023    TSH 2.21 06/10/2023     06/10/2023          The ASCVD Risk score (Fred DK, et al., 2019) failed to calculate for the following reasons:    The 2019 ASCVD risk score is only valid for ages 40 to 79     Assessment:     1. Routine general medical examination at a health care facility    2. Anxiety    3. Migraine with aura and without status migrainosus, not intractable      Plan:   1. Routine general medical examination at a health care facility  Overview:  Heart healthy diet, regular exercise, and regular use of sunscreen.    reviewed    Orders:  -     Comprehensive Metabolic Panel; Future; Expected date: 06/25/2024  -     CBC Auto Differential; Future; Expected date: 06/25/2024  -     TSH; Future; Expected date: 06/25/2024  -     Hemoglobin A1C; Future; Expected date: 06/25/2024    2. Anxiety  Overview:  Has hydroxyzine for prn use.     Orders:  -     busPIRone (BUSPAR) 5 MG Tab; Take 1 tablet (5 mg total) by mouth 2 (two) times daily.  Dispense: 180 tablet; Refill: 2  -     Ambulatory referral/consult to Psychiatry; Future; Expected date: 07/02/2024    3. Migraine with aura and without status migrainosus, not intractable  Overview:  Stable on topamax.           There are no Patient Instructions on file for this visit.    Future Appointments   Date Time Provider Department Center   7/25/2024 12:40 PM Marc Siegel MD [facility] [place]   10/10/2024  9:00 AM Hector Allen LCSW [facility] [place]   11/7/2024  2:00 PM Hector Allen LCSW [facility] [place]       Lab Frequency Next Occurrence   Comprehensive Metabolic Panel Once 06/19/2024       Follow up in about 4 weeks (around 7/23/2024), or if symptoms worsen or fail to improve, for Virtual Visit ok for f/u.

## 2024-06-26 LAB
ALBUMIN SERPL BCP-MCNC: 4.8 G/DL (ref 3.5–5.2)
ALP SERPL-CCNC: 70 U/L (ref 55–135)
ALT SERPL W/O P-5'-P-CCNC: 32 U/L (ref 10–44)
ANION GAP SERPL CALC-SCNC: 12 MMOL/L (ref 8–16)
AST SERPL-CCNC: 29 U/L (ref 10–40)
BASOPHILS # BLD AUTO: 0.06 K/UL (ref 0–0.2)
BASOPHILS NFR BLD: 1 % (ref 0–1.9)
BILIRUB SERPL-MCNC: 1.6 MG/DL (ref 0.1–1)
BUN SERPL-MCNC: 14 MG/DL (ref 6–20)
CALCIUM SERPL-MCNC: 10.1 MG/DL (ref 8.7–10.5)
CHLORIDE SERPL-SCNC: 105 MMOL/L (ref 95–110)
CO2 SERPL-SCNC: 24 MMOL/L (ref 23–29)
CREAT SERPL-MCNC: 1.2 MG/DL (ref 0.5–1.4)
DIFFERENTIAL METHOD BLD: ABNORMAL
EOSINOPHIL # BLD AUTO: 0.1 K/UL (ref 0–0.5)
EOSINOPHIL NFR BLD: 0.8 % (ref 0–8)
ERYTHROCYTE [DISTWIDTH] IN BLOOD BY AUTOMATED COUNT: 11.2 % (ref 11.5–14.5)
EST. GFR  (NO RACE VARIABLE): >60 ML/MIN/1.73 M^2
GLUCOSE SERPL-MCNC: 94 MG/DL (ref 70–110)
HCT VFR BLD AUTO: 45.9 % (ref 40–54)
HGB BLD-MCNC: 15.6 G/DL (ref 14–18)
IMM GRANULOCYTES # BLD AUTO: 0.01 K/UL (ref 0–0.04)
IMM GRANULOCYTES NFR BLD AUTO: 0.2 % (ref 0–0.5)
LYMPHOCYTES # BLD AUTO: 1.1 K/UL (ref 1–4.8)
LYMPHOCYTES NFR BLD: 18.2 % (ref 18–48)
MCH RBC QN AUTO: 30.5 PG (ref 27–31)
MCHC RBC AUTO-ENTMCNC: 34 G/DL (ref 32–36)
MCV RBC AUTO: 90 FL (ref 82–98)
MONOCYTES # BLD AUTO: 0.5 K/UL (ref 0.3–1)
MONOCYTES NFR BLD: 8.2 % (ref 4–15)
NEUTROPHILS # BLD AUTO: 4.3 K/UL (ref 1.8–7.7)
NEUTROPHILS NFR BLD: 71.6 % (ref 38–73)
NRBC BLD-RTO: 0 /100 WBC
PLATELET # BLD AUTO: 286 K/UL (ref 150–450)
PMV BLD AUTO: 10.2 FL (ref 9.2–12.9)
POTASSIUM SERPL-SCNC: 4.8 MMOL/L (ref 3.5–5.1)
PROT SERPL-MCNC: 7.8 G/DL (ref 6–8.4)
RBC # BLD AUTO: 5.11 M/UL (ref 4.6–6.2)
SODIUM SERPL-SCNC: 141 MMOL/L (ref 136–145)
TSH SERPL DL<=0.005 MIU/L-ACNC: 0.82 UIU/ML (ref 0.4–4)
WBC # BLD AUTO: 5.94 K/UL (ref 3.9–12.7)

## 2024-07-25 ENCOUNTER — OFFICE VISIT (OUTPATIENT)
Dept: INTERNAL MEDICINE | Facility: CLINIC | Age: 34
End: 2024-07-25
Payer: COMMERCIAL

## 2024-07-25 DIAGNOSIS — F41.9 ANXIETY: ICD-10-CM

## 2024-07-25 DIAGNOSIS — R07.9 CHEST PAIN, UNSPECIFIED TYPE: Primary | ICD-10-CM

## 2024-07-25 DIAGNOSIS — R00.2 PALPITATION: ICD-10-CM

## 2024-07-25 PROCEDURE — 99214 OFFICE O/P EST MOD 30 MIN: CPT | Mod: 95,,, | Performed by: INTERNAL MEDICINE

## 2024-07-25 PROCEDURE — 3044F HG A1C LEVEL LT 7.0%: CPT | Mod: CPTII,95,, | Performed by: INTERNAL MEDICINE

## 2024-07-25 NOTE — PROGRESS NOTES
Subjective:      Patient ID: Caesar Abreu is a 33 y.o. male.    Chief Complaint: No chief complaint on file.    HPI      The patient location is: [place]  The chief complaint leading to consultation is: anxiety.     Visit type:  Audiovisual    Face to Face time with patient: 15   minutes of total time spent on the encounter, which includes face to face time and non-face to face time preparing to see the patient (eg, review of tests), Obtaining and/or reviewing separately obtained history, Documenting clinical information in the electronic or other health record, Independently interpreting results (not separately reported) and communicating results to the patient/family/caregiver, or Care coordination (not separately reported).         Each patient to whom he or she provides medical services by telemedicine is:  (1) informed of the relationship between the physician and patient and the respective role of any other health care provider with respect to management of the patient; and (2) notified that he or she may decline to receive medical services by telemedicine and may withdraw from such care at any time.    Notes:     Presents today to discuss anxiety.     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 Compliant with buspirone. Helping with anxiety. Continues with heart palpitations. Had assoc pain and irreg heart beat 3 days ago walking. Had some sweating. No dyspnea. No radiation.     Review of Systems    Constitutional:  Negative for activity change and unexpected weight change.   HENT:  Negative for hearing loss, rhinorrhea and trouble swallowing.    Eyes:  Negative for discharge and visual disturbance.   Respiratory:  Positive for chest tightness. Negative for wheezing.    Cardiovascular:  Positive for palpitations. Negative for chest pain.   Gastrointestinal:  Negative for blood in stool, constipation, diarrhea and vomiting.   Endocrine: Negative for polydipsia and polyuria.   Genitourinary:  Negative for difficulty urinating, hematuria and urgency.   Musculoskeletal:  Negative for arthralgias, joint swelling and neck pain.   Neurological:  Positive for headaches. Negative for weakness.   Psychiatric/Behavioral:  Negative for confusion, dysphoric mood, hallucinations and suicidal ideas. The patient is nervous/anxious.      Objective:   There were no vitals taken for this visit.    Physical Exam  Constitutional:       General: He is not in acute distress.     Appearance: He is well-developed.   Cardiovascular:      Rate and Rhythm: Normal rate.   Pulmonary:      Effort: Pulmonary effort is normal.      Breath sounds: Normal breath sounds.   Skin:     General: Skin is warm and dry.   Psychiatric:         Behavior: Behavior normal.         Lab Results   Component Value Date    WBC 5.94 06/25/2024    HGB 15.6 06/25/2024    HGB 15.0 06/10/2023    HCT 45.9 06/25/2024    MCV 90 06/25/2024    MCV 87 06/10/2023     06/25/2024    CHOL 166 07/03/2023    TRIG 77 07/03/2023    HDL 45 07/03/2023    LDLCALC 105.6 07/03/2023    ALT 32 06/25/2024    AST 29 06/25/2024     06/25/2024    K 4.8 06/25/2024    CALCIUM 10.1 06/25/2024     06/25/2024    CO2 24 06/25/2024    BUN 14 06/25/2024    CREATININE 1.2 06/25/2024    CREATININE 0.95 06/10/2023    EGFRNORACEVR >60.0 06/25/2024    EGFRNORACEVR >60 06/10/2023    TSH 0.824 06/25/2024    TSH 2.21 06/10/2023    GLU 94 06/25/2024    HGBA1C 5.1 06/25/2024          The  ASCVD Risk score (Fred THOMAS, et al., 2019) failed to calculate for the following reasons:    The 2019 ASCVD risk score is only valid for ages 40 to 79     Assessment:     1. Chest pain, unspecified type    2. Palpitation    3. Anxiety      Plan:   1. Chest pain, unspecified type  -     Ambulatory referral/consult to Cardiology; Future; Expected date: 08/01/2024    2. Palpitation  -     Ambulatory referral/consult to Cardiology; Future; Expected date: 08/01/2024    3. Anxiety  Overview:  Has hydroxyzine for prn use.       Anxiety improved but not at goal.   Try increase buspirone to 5mg tid or 10 mg bid or 10mg tid    There are no Patient Instructions on file for this visit.    Future Appointments   Date Time Provider Department Center   10/10/2024  9:00 AM Hector Allen LCSW [facility] [place]   11/7/2024  2:00 PM Hector Allen LCSW [facility] [place]       Lab Frequency Next Occurrence   Ambulatory referral/consult to Psychiatry Once 07/02/2024       Follow up in about 3 months (around 10/25/2024), or if symptoms worsen or fail to improve, for Virtual Visit ok for f/u.

## 2024-08-06 DIAGNOSIS — Z76.89 ENCOUNTER TO ESTABLISH CARE: ICD-10-CM

## 2024-08-06 DIAGNOSIS — Z00.00 ROUTINE ADULT HEALTH MAINTENANCE: Primary | ICD-10-CM

## 2024-08-19 ENCOUNTER — OFFICE VISIT (OUTPATIENT)
Dept: CARDIOLOGY | Facility: CLINIC | Age: 34
End: 2024-08-19
Payer: COMMERCIAL

## 2024-08-19 ENCOUNTER — HOSPITAL ENCOUNTER (OUTPATIENT)
Dept: CARDIOLOGY | Facility: HOSPITAL | Age: 34
Discharge: HOME | End: 2024-08-19
Payer: COMMERCIAL

## 2024-08-19 VITALS
OXYGEN SATURATION: 98 % | BODY MASS INDEX: 25.21 KG/M2 | SYSTOLIC BLOOD PRESSURE: 128 MMHG | DIASTOLIC BLOOD PRESSURE: 78 MMHG | WEIGHT: 148.81 LBS | HEIGHT: 64 IN | WEIGHT: 147.69 LBS | HEART RATE: 61 BPM | BODY MASS INDEX: 25.54 KG/M2

## 2024-08-19 DIAGNOSIS — Z00.00 ROUTINE ADULT HEALTH MAINTENANCE: ICD-10-CM

## 2024-08-19 DIAGNOSIS — I47.9 TACHYCARDIA, PAROXYSMAL: ICD-10-CM

## 2024-08-19 DIAGNOSIS — Z82.49 FAMILY HISTORY OF HEART DISEASE: Primary | ICD-10-CM

## 2024-08-19 DIAGNOSIS — Z76.89 ENCOUNTER TO ESTABLISH CARE: ICD-10-CM

## 2024-08-19 DIAGNOSIS — R00.2 PALPITATION: ICD-10-CM

## 2024-08-19 DIAGNOSIS — R07.9 CHEST PAIN, UNSPECIFIED TYPE: ICD-10-CM

## 2024-08-19 DIAGNOSIS — R03.0 ELEVATED BLOOD PRESSURE READING: ICD-10-CM

## 2024-08-19 PROCEDURE — 93010 ELECTROCARDIOGRAM REPORT: CPT | Mod: ,,, | Performed by: INTERNAL MEDICINE

## 2024-08-19 PROCEDURE — 99999 PR PBB SHADOW E&M-EST. PATIENT-LVL III: CPT | Mod: PBBFAC,,,

## 2024-08-19 PROCEDURE — 3008F BODY MASS INDEX DOCD: CPT | Mod: CPTII,S$GLB,,

## 2024-08-19 PROCEDURE — 3074F SYST BP LT 130 MM HG: CPT | Mod: CPTII,S$GLB,,

## 2024-08-19 PROCEDURE — 1160F RVW MEDS BY RX/DR IN RCRD: CPT | Mod: CPTII,S$GLB,,

## 2024-08-19 PROCEDURE — 3044F HG A1C LEVEL LT 7.0%: CPT | Mod: CPTII,S$GLB,,

## 2024-08-19 PROCEDURE — 3078F DIAST BP <80 MM HG: CPT | Mod: CPTII,S$GLB,,

## 2024-08-19 PROCEDURE — 99215 OFFICE O/P EST HI 40 MIN: CPT | Mod: S$GLB,,,

## 2024-08-19 PROCEDURE — 93005 ELECTROCARDIOGRAM TRACING: CPT

## 2024-08-19 PROCEDURE — 1159F MED LIST DOCD IN RCRD: CPT | Mod: CPTII,S$GLB,,

## 2024-08-19 NOTE — PROGRESS NOTES
Subjective:   Patient ID:  Caesar Abreu is a 33 y.o. male who presents for evaluation of No chief complaint on file.      HPI 32y/o M with PMHx of chest pain, HTN, palpitations and anxiety. Here today c/o palpitations and episode of atypical chest pain. Pt states his sxs are worse with any adrenalin, when playing video games he had to stop that his heart was beating so fast he had chest pain and was diaphoretic. EKG today NSR. Echo last year with nml EF. Sig FH of CAD, father CAD, grandfather CHF    Past Medical History:   Diagnosis Date    History of chicken pox        Past Surgical History:   Procedure Laterality Date    ADENOIDECTOMY      arm fracture Right     FRACTURE SURGERY  2007    Right arm    TYMPANOSTOMY TUBE PLACEMENT         Social History     Tobacco Use    Smoking status: Never    Smokeless tobacco: Never   Substance Use Topics    Alcohol use: Yes     Alcohol/week: 2.0 standard drinks of alcohol     Types: 2 Shots of liquor per week     Comment: twice a week     Drug use: Never       Family History   Problem Relation Name Age of Onset    No Known Problems Mother      Cancer Father Darian garay         prostate    Cancer Maternal Grandmother Katty Abreu         breast     Diabetes Maternal Grandmother Katty Abreu     Hearing loss Maternal Grandmother Katty Abreu     Stroke Maternal Grandmother Katty Abreu     Cancer Maternal Grandfather Luis Alberto abreu     COPD Maternal Grandfather Luis Alberto abreu        Current Outpatient Medications on File Prior to Visit   Medication Sig Dispense Refill    busPIRone (BUSPAR) 5 MG Tab Take 1 tablet (5 mg total) by mouth 2 (two) times daily. 180 tablet 2    hydrOXYzine HCL (ATARAX) 25 MG tablet Take 1 tablet (25 mg total) by mouth 3 (three) times daily as needed for Anxiety. 30 tablet 0    levocetirizine (XYZAL) 5 MG tablet Take 1 tablet (5 mg total) by mouth every evening. 30 tablet 11    topiramate (TOPAMAX) 25 MG tablet Take 1 tablet (25 mg total) by mouth every  evening. 90 tablet 3    EPINEPHrine (EPIPEN) 0.3 mg/0.3 mL AtIn Inject 0.3 mLs (0.3 mg total) into the muscle once. for 1 dose 2 each 3     No current facility-administered medications on file prior to visit.      Wt Readings from Last 3 Encounters:   08/19/24 67 kg (147 lb 11.3 oz)   08/19/24 67.5 kg (148 lb 13 oz)   06/25/24 67 kg (147 lb 11.3 oz)     Temp Readings from Last 3 Encounters:   06/25/24 97.3 °F (36.3 °C) (Tympanic)   12/13/23 97.3 °F (36.3 °C) (Tympanic)   11/13/23 96.4 °F (35.8 °C) (Tympanic)     BP Readings from Last 3 Encounters:   08/19/24 128/78   06/25/24 124/80   12/13/23 118/80     Pulse Readings from Last 3 Encounters:   08/19/24 61   06/25/24 72   12/13/23 63        Review of Systems   Constitutional: Positive for diaphoresis.   HENT: Negative.     Eyes: Negative.    Cardiovascular:  Positive for chest pain and palpitations.   Respiratory: Negative.     Skin: Negative.    Musculoskeletal: Negative.    Gastrointestinal: Negative.    Genitourinary: Negative.    Neurological: Negative.    Psychiatric/Behavioral:  The patient is nervous/anxious.        Objective:   Physical Exam  Vitals and nursing note reviewed.   Constitutional:       Appearance: Normal appearance.   HENT:      Head: Normocephalic and atraumatic.   Eyes:      General:         Right eye: No discharge.         Left eye: No discharge.      Pupils: Pupils are equal, round, and reactive to light.   Cardiovascular:      Rate and Rhythm: Normal rate and regular rhythm.      Heart sounds: S1 normal and S2 normal. No murmur heard.     No friction rub.   Pulmonary:      Effort: Pulmonary effort is normal. No respiratory distress.      Breath sounds: Normal breath sounds. No rales.   Abdominal:      Palpations: Abdomen is soft.      Tenderness: There is no abdominal tenderness.   Musculoskeletal:      Cervical back: Neck supple.      Right lower leg: No edema.      Left lower leg: No edema.   Skin:     General: Skin is warm and dry.  "  Neurological:      General: No focal deficit present.      Mental Status: He is alert and oriented to person, place, and time.   Psychiatric:         Mood and Affect: Mood normal.         Behavior: Behavior normal.         Thought Content: Thought content normal.         Lab Results   Component Value Date    CHOL 166 07/03/2023     Lab Results   Component Value Date    HDL 45 07/03/2023     Lab Results   Component Value Date    LDLCALC 105.6 07/03/2023     Lab Results   Component Value Date    TRIG 77 07/03/2023     Lab Results   Component Value Date    CHOLHDL 27.1 07/03/2023       Chemistry        Component Value Date/Time     06/25/2024 1306    K 4.8 06/25/2024 1306     06/25/2024 1306    CO2 24 06/25/2024 1306    BUN 14 06/25/2024 1306    CREATININE 1.2 06/25/2024 1306    GLU 94 06/25/2024 1306        Component Value Date/Time    CALCIUM 10.1 06/25/2024 1306    ALKPHOS 70 06/25/2024 1306    AST 29 06/25/2024 1306    ALT 32 06/25/2024 1306    BILITOT 1.6 (H) 06/25/2024 1306          Lab Results   Component Value Date    TSH 0.824 06/25/2024     No results found for: "INR", "PROTIME"  @RESUFAST(WBC,HGB,HCT,MCV,PLT)  @LABRCNTIP(BNP,BNPTRIAGEBLO)@  CrCl cannot be calculated (Patient's most recent lab result is older than the maximum 7 days allowed.).     Results for orders placed during the hospital encounter of 11/21/23    Echo    Interpretation Summary    Left Ventricle: The left ventricle is normal in size. Normal wall thickness. Normal wall motion. There is normal systolic function with a visually estimated ejection fraction of 60 - 65%. There is normal diastolic function.    Right Ventricle: Normal right ventricular cavity size. Wall thickness is normal. Right ventricle wall motion  is normal. Systolic function is normal.    Pulmonary Artery: The estimated pulmonary artery systolic pressure is 17 mmHg.    IVC/SVC: Normal venous pressure at 3 mmHg.     No results found for this or any previous " visit.     Assessment:      1. Family history of heart disease    2. Chest pain, unspecified type    3. Palpitation    4. Tachycardia, paroxysmal    5. Elevated blood pressure reading        Plan:   Family history of heart disease    Chest pain, unspecified type  -     Ambulatory referral/consult to Cardiology  -     Exercise Stress - EKG; Future  -     Cardiac Monitor - 3-15 Day Adult (Cupid Only); Future    Palpitation  -     Ambulatory referral/consult to Cardiology  -     Exercise Stress - EKG; Future  -     Cardiac Monitor - 3-15 Day Adult (Cupid Only); Future    Tachycardia, paroxysmal  -     Exercise Stress - EKG; Future  -     Cardiac Monitor - 3-15 Day Adult (Cupid Only); Future    Elevated blood pressure reading      Exercise stress, 2 week vital  RF modifications  Limiting caffeine, no energy drinks  Daily exercise as tolerated    RTC after kelseyter    Courtney Guillot, FNP-C [facility], Cardiology

## 2024-08-20 LAB
OHS QRS DURATION: 82 MS
OHS QTC CALCULATION: 398 MS

## 2024-09-09 ENCOUNTER — PATIENT MESSAGE (OUTPATIENT)
Dept: CARDIOLOGY | Facility: HOSPITAL | Age: 34
End: 2024-09-09
Payer: COMMERCIAL

## 2024-09-11 ENCOUNTER — PATIENT MESSAGE (OUTPATIENT)
Dept: CARDIOLOGY | Facility: HOSPITAL | Age: 34
End: 2024-09-11
Payer: COMMERCIAL

## 2024-09-12 ENCOUNTER — TELEPHONE (OUTPATIENT)
Dept: CARDIOLOGY | Facility: CLINIC | Age: 34
End: 2024-09-12
Payer: COMMERCIAL

## 2024-09-12 NOTE — TELEPHONE ENCOUNTER
Unable to contact PT left VM        ----- Message from Charlotte Orleandro sent at 9/12/2024 11:40 AM CDT -----  Contact: Pt [phone]  Returning a phone call.  Who left a message for the patient:  Jess     Do they know what this is regarding:  yes    Would they like a phone call back or a response via "[employer], [employer]"ner:  call back

## 2024-09-30 PROBLEM — Z00.00 ROUTINE GENERAL MEDICAL EXAMINATION AT A HEALTH CARE FACILITY: Status: RESOLVED | Noted: 2023-07-01 | Resolved: 2024-09-30

## 2024-10-01 ENCOUNTER — TELEPHONE (OUTPATIENT)
Dept: CARDIOLOGY | Facility: HOSPITAL | Age: 34
End: 2024-10-01
Payer: COMMERCIAL

## 2024-10-01 NOTE — TELEPHONE ENCOUNTER
----- Message from Taisha sent at 9/30/2024  4:09 PM CDT -----  Contact: [phone] Pt  1MEDICALADVICE     Patient is calling for Medical Advice regarding:    Patient wants a call back or thru [facility]:Call back     Comments:Pt would like a call back due to upcoming appt. Pt appt 10/11 pt would like a call before appt.    Please advise patient replies from provider may take up to 48 hours.

## 2024-10-01 NOTE — TELEPHONE ENCOUNTER
Returned call. Stress test and holter scheduled for 10/10/24 at [facility] location, starting at 1300.  Patient is aware of appt as on 10/10/2024 at [facility] at 0900.

## 2024-10-08 ENCOUNTER — TELEPHONE (OUTPATIENT)
Dept: PSYCHIATRY | Facility: CLINIC | Age: 34
End: 2024-10-08
Payer: COMMERCIAL

## 2024-10-10 ENCOUNTER — OFFICE VISIT (OUTPATIENT)
Dept: PSYCHIATRY | Facility: CLINIC | Age: 34
End: 2024-10-10
Payer: COMMERCIAL

## 2024-10-10 ENCOUNTER — HOSPITAL ENCOUNTER (OUTPATIENT)
Dept: CARDIOLOGY | Facility: HOSPITAL | Age: 34
Discharge: HOME | End: 2024-10-10
Payer: COMMERCIAL

## 2024-10-10 VITALS
HEIGHT: 65 IN | BODY MASS INDEX: 23.82 KG/M2 | DIASTOLIC BLOOD PRESSURE: 68 MMHG | WEIGHT: 143 LBS | HEART RATE: 70 BPM | SYSTOLIC BLOOD PRESSURE: 128 MMHG

## 2024-10-10 DIAGNOSIS — R07.9 CHEST PAIN, UNSPECIFIED TYPE: ICD-10-CM

## 2024-10-10 DIAGNOSIS — F43.22 ADJUSTMENT DISORDER WITH ANXIETY: ICD-10-CM

## 2024-10-10 DIAGNOSIS — I47.9 TACHYCARDIA, PAROXYSMAL: ICD-10-CM

## 2024-10-10 DIAGNOSIS — R00.2 PALPITATION: ICD-10-CM

## 2024-10-10 PROCEDURE — 93016 CV STRESS TEST SUPVJ ONLY: CPT | Mod: ,,, | Performed by: INTERNAL MEDICINE

## 2024-10-10 PROCEDURE — 93017 CV STRESS TEST TRACING ONLY: CPT

## 2024-10-10 PROCEDURE — 93018 CV STRESS TEST I&R ONLY: CPT | Mod: ,,, | Performed by: INTERNAL MEDICINE

## 2024-10-10 PROCEDURE — 99999 PR PBB SHADOW E&M-EST. PATIENT-LVL I: CPT | Mod: PBBFAC,,, | Performed by: SOCIAL WORKER
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
## 2024-10-10 NOTE — LETTER
October 11, 2024        Marc Siegel MD  [address]  [place] [address]             [facility]  [address]  [place] [address]  Phone: [phone]  Fax: [phone]   Patient: Caesar Abreu   MR Number: [id number]   YOB: 1990   Date of Visit: 10/10/2024       Dear Dr. Siegel:    Thank you for referring Caesar Abreu to me for evaluation. Below are the relevant portions of my assessment and plan of care.    If you have questions, please do not hesitate to call me. I look forward to following Caesar along with you.    Sincerely,      Hector Allen, LCSW           CC    No Recipients

## 2024-10-11 LAB
CV STRESS BASE HR: 70 BPM
DIASTOLIC BLOOD PRESSURE: 68 MMHG
OHS CV CPX 1 MINUTE RECOVERY HEART RATE: 148 BPM
OHS CV CPX 85 PERCENT MAX PREDICTED HEART RATE MALE: 160
OHS CV CPX ESTIMATED METS: 12
OHS CV CPX MAX PREDICTED HEART RATE: 188
OHS CV CPX PATIENT IS FEMALE: 0
OHS CV CPX PATIENT IS MALE: 1
OHS CV CPX PEAK DIASTOLIC BLOOD PRESSURE: 83 MMHG
OHS CV CPX PEAK HEAR RATE: 169 BPM
OHS CV CPX PEAK RATE PRESSURE PRODUCT: NORMAL
OHS CV CPX PEAK SYSTOLIC BLOOD PRESSURE: 184 MMHG
OHS CV CPX PERCENT MAX PREDICTED HEART RATE ACHIEVED: 90
OHS CV CPX RATE PRESSURE PRODUCT PRESENTING: 8960
STRESS ECHO POST EXERCISE DUR MIN: 9 MINUTES
STRESS ECHO POST EXERCISE DUR SEC: 29 SECONDS
STRESS ST DEPRESSION: 3 MM
SYSTOLIC BLOOD PRESSURE: 128 MMHG

## 2024-10-14 ENCOUNTER — TELEPHONE (OUTPATIENT)
Dept: CARDIOLOGY | Facility: CLINIC | Age: 34
End: 2024-10-14
Payer: COMMERCIAL

## 2024-10-14 DIAGNOSIS — R07.9 CHEST PAIN, UNSPECIFIED TYPE: Primary | ICD-10-CM

## 2024-10-14 NOTE — TELEPHONE ENCOUNTER
Contacted pt and informed him Stress test was abnormal. I recommend gettting  a Cardiac CTA which will show us what the vessels look like. Pt states he knows his BP has been on and off high since he has been feeling bad, but he has not had any pain. Pt okay w/ cardiac CTA. Pt vu w/o q/c    ----- Message from Yanci Morin NP sent at 10/14/2024  3:19 PM CDT -----  Stress test was abnormal. I recommend gettting  a Cardiac CTA which will show us what the vessels look like. Is he still having symptoms?

## 2024-10-15 ENCOUNTER — TELEPHONE (OUTPATIENT)
Dept: CARDIOLOGY | Facility: CLINIC | Age: 34
End: 2024-10-15
Payer: COMMERCIAL

## 2024-10-15 NOTE — TELEPHONE ENCOUNTER
Attempted to contact pt to inform him that we got him scheduled for Cardiac CTA at [facility] on11/18/24 at 1:00P. No answer, LVM    ----- Message from Yanci Morin NP sent at 10/14/2024  3:19 PM CDT -----  Stress test was abnormal. I recommend gettting  a Cardiac CTA which will show us what the vessels look like. Is he still having symptoms?

## 2024-10-25 ENCOUNTER — OFFICE VISIT (OUTPATIENT)
Dept: INTERNAL MEDICINE | Facility: CLINIC | Age: 34
End: 2024-10-25
Payer: COMMERCIAL

## 2024-10-25 DIAGNOSIS — R06.81 APNEA: ICD-10-CM

## 2024-10-25 DIAGNOSIS — R03.0 ELEVATED BLOOD PRESSURE READING: ICD-10-CM

## 2024-10-25 DIAGNOSIS — R00.2 PALPITATION: ICD-10-CM

## 2024-10-25 DIAGNOSIS — G47.8 NON-RESTORATIVE SLEEP: Primary | ICD-10-CM

## 2024-10-28 ENCOUNTER — PATIENT MESSAGE (OUTPATIENT)
Dept: PULMONOLOGY | Facility: CLINIC | Age: 34
End: 2024-10-28
Payer: COMMERCIAL

## 2024-11-01 ENCOUNTER — LAB VISIT (OUTPATIENT)
Dept: LAB | Facility: HOSPITAL | Age: 34
End: 2024-11-01
Attending: NURSE PRACTITIONER
Payer: COMMERCIAL

## 2024-11-01 ENCOUNTER — OFFICE VISIT (OUTPATIENT)
Dept: PRIMARY CARE CLINIC | Facility: CLINIC | Age: 34
End: 2024-11-01
Payer: COMMERCIAL

## 2024-11-01 ENCOUNTER — TELEPHONE (OUTPATIENT)
Dept: CARDIOLOGY | Facility: CLINIC | Age: 34
End: 2024-11-01
Payer: COMMERCIAL

## 2024-11-01 VITALS
DIASTOLIC BLOOD PRESSURE: 82 MMHG | TEMPERATURE: 98 F | HEIGHT: 65 IN | SYSTOLIC BLOOD PRESSURE: 124 MMHG | BODY MASS INDEX: 25.51 KG/M2 | RESPIRATION RATE: 18 BRPM | HEART RATE: 74 BPM | OXYGEN SATURATION: 98 % | WEIGHT: 153.13 LBS

## 2024-11-01 DIAGNOSIS — R35.0 URINARY FREQUENCY: ICD-10-CM

## 2024-11-01 DIAGNOSIS — M62.838 OTHER MUSCLE SPASM: ICD-10-CM

## 2024-11-01 DIAGNOSIS — R68.83 CHILLS: ICD-10-CM

## 2024-11-01 DIAGNOSIS — F41.9 ANXIETY: ICD-10-CM

## 2024-11-01 DIAGNOSIS — R39.15 URINARY URGENCY: ICD-10-CM

## 2024-11-01 DIAGNOSIS — R10.9 BILATERAL FLANK PAIN: Primary | ICD-10-CM

## 2024-11-01 DIAGNOSIS — Z91.013 H/O ALLERGY TO SHELLFISH: ICD-10-CM

## 2024-11-01 DIAGNOSIS — R10.9 BILATERAL FLANK PAIN: ICD-10-CM

## 2024-11-01 LAB
BILIRUB UR QL STRIP: NEGATIVE
CLARITY UR REFRACT.AUTO: CLEAR
COLOR UR AUTO: YELLOW
GLUCOSE UR QL STRIP: NEGATIVE
HGB UR QL STRIP: NEGATIVE
KETONES UR QL STRIP: NEGATIVE
LEUKOCYTE ESTERASE UR QL STRIP: NEGATIVE
NITRITE UR QL STRIP: NEGATIVE
PH UR STRIP: 6 [PH] (ref 5–8)
PROT UR QL STRIP: ABNORMAL
SP GR UR STRIP: 1.01 (ref 1–1.03)
URN SPEC COLLECT METH UR: ABNORMAL

## 2024-11-01 PROCEDURE — 1159F MED LIST DOCD IN RCRD: CPT | Mod: CPTII,S$GLB,, | Performed by: NURSE PRACTITIONER

## 2024-11-01 PROCEDURE — 99999 PR PBB SHADOW E&M-EST. PATIENT-LVL IV: CPT | Mod: PBBFAC,,, | Performed by: NURSE PRACTITIONER

## 2024-11-01 PROCEDURE — 3074F SYST BP LT 130 MM HG: CPT | Mod: CPTII,S$GLB,, | Performed by: NURSE PRACTITIONER

## 2024-11-01 PROCEDURE — 3008F BODY MASS INDEX DOCD: CPT | Mod: CPTII,S$GLB,, | Performed by: NURSE PRACTITIONER

## 2024-11-01 PROCEDURE — 96372 THER/PROPH/DIAG INJ SC/IM: CPT | Mod: S$GLB,,, | Performed by: NURSE PRACTITIONER

## 2024-11-01 PROCEDURE — 3044F HG A1C LEVEL LT 7.0%: CPT | Mod: CPTII,S$GLB,, | Performed by: NURSE PRACTITIONER

## 2024-11-01 PROCEDURE — 3079F DIAST BP 80-89 MM HG: CPT | Mod: CPTII,S$GLB,, | Performed by: NURSE PRACTITIONER

## 2024-11-01 PROCEDURE — 99214 OFFICE O/P EST MOD 30 MIN: CPT | Mod: 25,S$GLB,, | Performed by: NURSE PRACTITIONER

## 2024-11-01 PROCEDURE — 81003 URINALYSIS AUTO W/O SCOPE: CPT | Performed by: NURSE PRACTITIONER

## 2024-11-01 RX ORDER — BACLOFEN 10 MG/1
10 TABLET ORAL 3 TIMES DAILY
Qty: 90 TABLET | Refills: 11 | Status: SHIPPED | OUTPATIENT
Start: 2024-11-01 | End: 2025-11-01

## 2024-11-01 RX ORDER — KETOROLAC TROMETHAMINE 30 MG/ML
60 INJECTION, SOLUTION INTRAMUSCULAR; INTRAVENOUS
Status: COMPLETED | OUTPATIENT
Start: 2024-11-01 | End: 2024-11-01

## 2024-11-01 RX ADMIN — KETOROLAC TROMETHAMINE 60 MG: 30 INJECTION, SOLUTION INTRAMUSCULAR; INTRAVENOUS at 03:11

## 2024-11-01 NOTE — PROGRESS NOTES
"Chief Complaint  Chief Complaint   Patient presents with    Follow-up     Kidney issues         History of Present Illness    Mr. Abreu presents today for kidney pain.    He reports sudden onset of kidney pain last night, beginning as slight discomfort while lying down and progressively worsening. The pain is primarily localized to one spot (90% of discomfort), with a secondary area slightly adjacent. He experiences frequent urinary urgency with small amounts of output. He denies observing blood in his urine. Currently, he describes the sensation as "just kind of uncomfortable." He also experienced chills lasting approximately 10 minutes, which have since resolved. He denies fever but did not have a thermometer to check. Ibuprofen helped alleviate his symptoms.    He has a history of blood pressure issues with recent significantly elevated readings. His highest recorded blood pressure was 190/118-119, with a severe episode of 186/102 accompanied by blurred vision and general malaise. He has had palpitations. Recent heart monitor results showed PVCs with two morphologies and two couples overall. During a stress test, his blood pressure increased rapidly after his heart rate reached 150-160 BPM, necessitating early termination.    He reports a history of migraines, for which he takes Topamax as needed rather than regularly as prescribed.    He acknowledges experiencing anxiety, particularly related to checking his blood pressure. He reports no prior history of anxiety and states his life is currently "the best it's ever been."    His wife has observed periods where he appears to stop breathing during sleep, suggesting possible sleep apnea. He has been referred to pulmonology for further evaluation.    He reports taking BuSpar and Topamax (at night for migraines). He admits to not taking medications as prescribed and has informed his doctor about this non-adherence.    He reports an allergy to shellfish. He denies any " other food, insect, or medication allergies.    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 He was offshore for 1.5 weeks prior to this visit. He consumes alcohol at home but denies drinking while offshore due to workplace restrictions. His diet includes meat and seafood.      ROS:  General: -fever, +chills, -fatigue, -weight gain, -weight loss  Eyes: +vision changes, -redness, -discharge  ENT: -ear pain, -nasal congestion, -sore throat  Cardiovascular: -chest pain, +palpitations, -lower extremity edema  Respiratory: -cough, -shortness of breath  Gastrointestinal: +abdominal pain, -nausea, -vomiting, -diarrhea, -constipation, -blood in stool  Genitourinary: -dysuria, -hematuria, -frequency, +urgency  Musculoskeletal: -joint pain, -muscle pain  Skin: -rash, -lesion  Neurological: -headache, -dizziness, -numbness, -tingling  Psychiatric: +anxiety, -depression, +sleep difficulty  Allergic: +allergic reactions          PAST MEDICAL HISTORY:  Past Medical History:   Diagnosis Date    History of chicken pox        PAST SURGICAL HISTORY:  Past Surgical History:   Procedure Laterality Date    ADENOIDECTOMY      arm fracture Right     FRACTURE SURGERY  2007    Right arm    TYMPANOSTOMY TUBE PLACEMENT         SOCIAL HISTORY:  Social History     Socioeconomic History    Marital status:    Tobacco Use    Smoking status: Never     Passive exposure: Never    Smokeless tobacco: Never   Substance and Sexual Activity    Alcohol use: Yes     Alcohol/week: 2.0 standard drinks of alcohol     Types: 2 Shots of liquor per week     Comment: twice a week     Drug use: Never    Sexual activity: Yes     Partners: Female     Birth control/protection: Inserts     Social Drivers of Health     Financial Resource Strain: Low Risk  (7/22/2024)    Overall Financial Resource Strain (CARDIA)     Difficulty of Paying Living Expenses: Not hard at all   Food Insecurity: No Food Insecurity (7/22/2024)     Hunger Vital Sign     Worried About Running Out of Food in the Last Year: Never true     Ran Out of Food in the Last Year: Never true   Physical Activity: Insufficiently Active (7/22/2024)    Exercise Vital Sign     Days of Exercise per Week: 2 days     Minutes of Exercise per Session: 30 min   Stress: No Stress Concern Present (7/22/2024)    [employer] [place] [employer] - Occupational Stress Questionnaire     Feeling of Stress : Only a little   Housing Stability: Unknown (7/22/2024)    Housing Stability Vital Sign     Unable to Pay for Housing in the Last Year: No       FAMILY HISTORY:  Family History   Problem Relation Name Age of Onset    No Known Problems Mother      Cancer Father Darian garay         prostate    Cancer Maternal Grandmother Katty Wilkinson         breast     Diabetes Maternal Grandmother Katty Wilkinson     Hearing loss Maternal Grandmother Katty Wilkinson     Stroke Maternal Grandmother Katty Wilkinson     Cancer Maternal Grandfather Luis Alberto wilkinson     COPD Maternal Grandfather Luis Alberto wilkinson        ALLERGIES AND MEDICATIONS: updated and reviewed.  Review of patient's allergies indicates:   Allergen Reactions    Shellfish containing products Hives and Swelling     Current Outpatient Medications   Medication Sig Dispense Refill    baclofen (LIORESAL) 10 MG tablet Take 1 tablet (10 mg total) by mouth 3 (three) times daily. 90 tablet 11    busPIRone (BUSPAR) 5 MG Tab Take 1 tablet (5 mg total) by mouth 2 (two) times daily. 180 tablet 2    EPINEPHrine (EPIPEN) 0.3 mg/0.3 mL AtIn Inject 0.3 mLs (0.3 mg total) into the muscle once. for 1 dose 2 each 3    hydrOXYzine HCL (ATARAX) 25 MG tablet Take 1 tablet (25 mg total) by mouth 3 (three) times daily as needed for Anxiety. 30 tablet 0    levocetirizine (XYZAL) 5 MG tablet Take 1 tablet (5 mg total) by mouth every evening. 30 tablet 11    topiramate (TOPAMAX) 25 MG tablet Take 1 tablet (25 mg total) by mouth every evening. 90 tablet 3     No current  "facility-administered medications for this visit.           Physical Exam    Vitals: Blood pressure: 124/82.  General: No acute distress. Well-developed. Well-nourished.  Head: Normocephalic. Atraumatic.  Eyes: EOMI. PERRLA. Conjunctivae non-injected. Sclerae anicteric.  Ears: Bilateral EACs clear. Bilateral TMs clear and intact.  Nose: Nares patent. Normal turbinates. No nasal discharge.  Mouth: Moist oral mucosa. Normal dentition.  Throat: No erythema. No exudate. Uvula midline.  Neck: Supple. Full ROM. Non-tender. No JVD. No carotid bruits. No thyromegaly. No lymphadenopathy.  Cardiovascular: Regular rate. Regular rhythm. No murmurs. No rubs. No gallops. Normal S1, S2. Peripheral pulses 2+ and symmetric.  Respiratory: Normal respiratory effort. Clear to auscultation bilaterally. No rales. No rhonchi. No wheezing.  Abdomen: Soft. Non-tender. Non-distended. Normal bowel sounds. Negative McBurney's. Negative Benson's. No rebound. No guarding. No hepatosplenomegaly. No masses.  Musculoskeletal: No  obvious deformity. Normal muscle development. Normal muscle tone. FROM at all joints. No swelling. No tenderness. CVA tenderness present.  Back: No CVA tenderness. No spinal deformity.  Extremities: No cyanosis. No clubbing. No upper extremity edema. No lower extremity edema.  Neurological: Alert & oriented x3. CN II-XII intact. Reflexes 2+ throughout. Strength 5/5 in all extremities. Sensation intact. No slurred speech. Normal gait.  Psychiatric: Normal mood. Normal affect. Good insight. Good judgment. No evidence of suicidal ideation. No evidence of homicidal ideation.  Skin: Warm. Dry. Intact. No rash. No ulcers. No suspicious lesions.        Vitals:    11/01/24 1440   BP: 124/82   BP Location: Left arm   Patient Position: Sitting   Pulse: 74   Resp: 18   Temp: 97.5 °F (36.4 °C)   TempSrc: Tympanic   SpO2: 98%   Weight: 69.4 kg (153 lb 1.8 oz)   Height: 5' 5" (1.651 m)    Body mass index is 25.48 kg/m².  Weight: 69.4 kg " "(153 lb 1.8 oz)   Height: 5' 5" (165.1 cm)       Health Maintenance         Date Due Completion Date    Influenza Vaccine (1) 09/01/2024 11/30/2005    COVID-19 Vaccine (2 - 2024-25 season) 09/01/2024 12/16/2021    TETANUS VACCINE 06/27/2033 6/27/2023    RSV Vaccine (Age 60+ and Pregnant patients) (1 - 1-dose 75+ series) 11/27/2066 ---            Assessment & Plan      IMPRESSION:  -Evaluated patient for suspected kidney stones based on acute bilateral flank pain, urinary frequency/urgency, and chills  -Considered potential for UTI secondary to urinary stasis from stones  -Noted patient's recent abnormal stress test results and ongoing cardiac workup  -Considered sleep apnea as potential contributing factor to patient's symptoms    KIDNEY STONES:  - Explained relationship between diet (elevated meat consumption) and kidney stone formation.  - MrYareli Meyerel to reduce red meat consumption to lower risk of kidney stone formation.    SLEEP APNEA:  - Discussed sleep apnea and its potential health impacts.    PAIN MANAGEMENT:  - Started Toradol (ketorolac) 60 mg intramuscular injection.  - Instructed to avoid ibuprofen for 8 hours after Toradol injection.    LABS:  - Ordered CBC, CMP, and urinalysis.    FOLLOW UP:  - Contact the office or go to the nearest ER if symptoms worsen.  - Recommend [facility] for emergency care if needed.        Problem List Items Addressed This Visit          Psychiatric    Anxiety    Overview     Has hydroxyzine for prn use.             Other    H/O allergy to shellfish     Other Visit Diagnoses       Bilateral flank pain    -  Primary    Relevant Medications    ketorolac injection 60 mg (Completed)    Other Relevant Orders    CBC Auto Differential (Completed)    Comprehensive Metabolic Panel (Completed)    Urinalysis, Reflex to Urine Culture Urine, Clean Catch (Completed)    Urinary frequency        Relevant Medications    ketorolac injection 60 mg (Completed)    Other Relevant Orders    CBC " Auto Differential (Completed)    Comprehensive Metabolic Panel (Completed)    Urinalysis, Reflex to Urine Culture Urine, Clean Catch (Completed)    Urinary urgency        Relevant Medications    ketorolac injection 60 mg (Completed)    Other Relevant Orders    CBC Auto Differential (Completed)    Comprehensive Metabolic Panel (Completed)    Urinalysis, Reflex to Urine Culture Urine, Clean Catch (Completed)    Chills        Relevant Orders    CBC Auto Differential (Completed)    Comprehensive Metabolic Panel (Completed)    Urinalysis, Reflex to Urine Culture Urine, Clean Catch (Completed)    Other muscle spasm        Relevant Orders    CBC Auto Differential (Completed)    Comprehensive Metabolic Panel (Completed)    Urinalysis, Reflex to Urine Culture Urine, Clean Catch (Completed)              Healthcare Maintenance: Deferred at this time.       22+ minutes of total time spent on the encounter, which includes face to face time and non-face to face time preparing to see the patient (eg, review of tests), Obtaining and/or reviewing separately obtained history, documenting clinical information in the electronic or other health record, independently interpreting results (not separately reported) and communicating results to the patient/family/caregiver, or Care coordination (not separately reported). Visit today included increased complexity associated with the care of the episodic problem addressed and managing the longitudinal care of the patient due to the serious and/or complex managed problem(s).        Sincerely,  Manuel Patel NP

## 2024-11-01 NOTE — LETTER
November 1, 2024      [facility]  [address]  [place] [address]  Phone: [phone]  Fax: [phone]       Patient: Caesar Abreu   YOB: 1990  Date of Visit: 11/01/2024    To Whom It May Concern:    ÓSCAR Abreu  was at [facility] on 11/01/2024. The patient may return to work on scheduled day  with no restrictions. If you have any questions or concerns, or if I can be of further assistance, please do not hesitate to contact me. Medically cleared     Sincerely,    Lizz Dias LPN

## 2024-11-04 ENCOUNTER — PATIENT MESSAGE (OUTPATIENT)
Dept: PRIMARY CARE CLINIC | Facility: CLINIC | Age: 34
End: 2024-11-04
Payer: COMMERCIAL

## 2024-11-05 ENCOUNTER — PATIENT MESSAGE (OUTPATIENT)
Dept: RESEARCH | Facility: HOSPITAL | Age: 34
End: 2024-11-05
Payer: COMMERCIAL

## 2024-11-05 ENCOUNTER — OFFICE VISIT (OUTPATIENT)
Dept: PRIMARY CARE CLINIC | Facility: CLINIC | Age: 34
End: 2024-11-05
Payer: COMMERCIAL

## 2024-11-05 ENCOUNTER — PATIENT MESSAGE (OUTPATIENT)
Dept: PRIMARY CARE CLINIC | Facility: CLINIC | Age: 34
End: 2024-11-05

## 2024-11-05 DIAGNOSIS — M62.838 OTHER MUSCLE SPASM: ICD-10-CM

## 2024-11-05 DIAGNOSIS — G43.109 MIGRAINE WITH AURA AND WITHOUT STATUS MIGRAINOSUS, NOT INTRACTABLE: ICD-10-CM

## 2024-11-05 DIAGNOSIS — N17.9 AKI (ACUTE KIDNEY INJURY): Primary | ICD-10-CM

## 2024-11-05 DIAGNOSIS — R35.0 URINARY FREQUENCY: ICD-10-CM

## 2024-11-05 DIAGNOSIS — R68.83 CHILLS: ICD-10-CM

## 2024-11-05 DIAGNOSIS — E86.0 DEHYDRATION: ICD-10-CM

## 2024-11-05 DIAGNOSIS — R10.9 BILATERAL FLANK PAIN: ICD-10-CM

## 2024-11-05 DIAGNOSIS — F41.9 ANXIETY: ICD-10-CM

## 2024-11-05 PROBLEM — Z91.013 H/O ALLERGY TO SHELLFISH: Status: ACTIVE | Noted: 2024-11-05

## 2024-11-05 PROCEDURE — 1159F MED LIST DOCD IN RCRD: CPT | Mod: CPTII,95,, | Performed by: NURSE PRACTITIONER

## 2024-11-05 PROCEDURE — G2211 COMPLEX E/M VISIT ADD ON: HCPCS | Mod: 95,,, | Performed by: NURSE PRACTITIONER

## 2024-11-05 PROCEDURE — 99213 OFFICE O/P EST LOW 20 MIN: CPT | Mod: 95,,, | Performed by: NURSE PRACTITIONER

## 2024-11-05 PROCEDURE — 3044F HG A1C LEVEL LT 7.0%: CPT | Mod: CPTII,95,, | Performed by: NURSE PRACTITIONER

## 2024-11-05 PROCEDURE — 1160F RVW MEDS BY RX/DR IN RCRD: CPT | Mod: CPTII,95,, | Performed by: NURSE PRACTITIONER

## 2024-11-05 NOTE — PROGRESS NOTES
"Chief Complaint  Chief Complaint   Patient presents with    Results        History of Present Illness    Mr. Abreu presents today for follow-up on kidney issues.    He reports recent kidney issues. He received a Toradol injection for pain, which provided relief. Currently, he experiences a dull discomfort in the kidney area, describing it as feeling "inflamed or bigger" but not painful. He is scheduled for a 24-hour urinalysis, which he will  when ready. He inquires about potential effects of Pepcid on kidney stones.    He takes Pepcid approximately once a week. He expresses concern about antihistamines causing significant dehydration.    He tries to maintain a healthy diet, avoiding processed foods. He eats fish once a week but acknowledges consuming a significant amount of red meat at work. He typically drinks one beer per week, but recently increased his consumption to three or four beers in a week. He doubts this level of alcohol intake would have significantly impacted his health, but acknowledges the possibility that the timing of his labs may have influenced the results.    He reports an allergy to shellfish, but confirms that he does consume fish.      ROS:  General: -fever, -chills, -fatigue, -weight gain, -weight loss  Eyes: -vision changes, -redness, -discharge  ENT: -ear pain, -nasal congestion, -sore throat, +dry mouth  Cardiovascular: -chest pain, -palpitations, -lower extremity edema  Respiratory: -cough, -shortness of breath  Gastrointestinal: -abdominal pain, -nausea, -vomiting, -diarrhea, -constipation, -blood in stool  Genitourinary: -dysuria, -hematuria, -frequency  Musculoskeletal: -joint pain, -muscle pain  Skin: -rash, -lesion  Neurological: -headache, -dizziness, -numbness, -tingling  Psychiatric: -anxiety, -depression, -sleep difficulty          PAST MEDICAL HISTORY:  Past Medical History:   Diagnosis Date    History of chicken pox        PAST SURGICAL HISTORY:  Past Surgical History: "   Procedure Laterality Date    ADENOIDECTOMY      arm fracture Right     FRACTURE SURGERY  2007    Right arm    TYMPANOSTOMY TUBE PLACEMENT         SOCIAL HISTORY:  Social History     Socioeconomic History    Marital status:    Tobacco Use    Smoking status: Never     Passive exposure: Never    Smokeless tobacco: Never   Substance and Sexual Activity    Alcohol use: Yes     Alcohol/week: 2.0 standard drinks of alcohol     Types: 2 Shots of liquor per week     Comment: twice a week     Drug use: Never    Sexual activity: Yes     Partners: Female     Birth control/protection: Inserts     Social Drivers of Health     Financial Resource Strain: Low Risk  (7/22/2024)    Overall Financial Resource Strain (CARDIA)     Difficulty of Paying Living Expenses: Not hard at all   Food Insecurity: No Food Insecurity (7/22/2024)    Hunger Vital Sign     Worried About Running Out of Food in the Last Year: Never true     Ran Out of Food in the Last Year: Never true   Physical Activity: Insufficiently Active (7/22/2024)    Exercise Vital Sign     Days of Exercise per Week: 2 days     Minutes of Exercise per Session: 30 min   Stress: No Stress Concern Present (7/22/2024)    [employer] - Occupational Stress Questionnaire     Feeling of Stress : Only a little   Housing Stability: Unknown (7/22/2024)    Housing Stability Vital Sign     Unable to Pay for Housing in the Last Year: No       FAMILY HISTORY:  Family History   Problem Relation Name Age of Onset    No Known Problems Mother      Cancer Father Darian garay         prostate    Cancer Maternal Grandmother Katty Wilkinson         breast     Diabetes Maternal Grandmother Katty Wilkinson     Hearing loss Maternal Grandmother Katty Wilkinson     Stroke Maternal Grandmother Katty Luisel     Cancer Maternal Grandfather Luis Alberto luisel     COPD Maternal Grandfather Luis Alberto wilkinson        ALLERGIES AND MEDICATIONS: updated and reviewed.  Review of patient's allergies  indicates:   Allergen Reactions    Shellfish containing products Hives and Swelling     Current Outpatient Medications   Medication Sig Dispense Refill    baclofen (LIORESAL) 10 MG tablet Take 1 tablet (10 mg total) by mouth 3 (three) times daily. 90 tablet 11    busPIRone (BUSPAR) 5 MG Tab Take 1 tablet (5 mg total) by mouth 2 (two) times daily. 180 tablet 2    EPINEPHrine (EPIPEN) 0.3 mg/0.3 mL AtIn Inject 0.3 mLs (0.3 mg total) into the muscle once. for 1 dose 2 each 3    hydrOXYzine HCL (ATARAX) 25 MG tablet Take 1 tablet (25 mg total) by mouth 3 (three) times daily as needed for Anxiety. 30 tablet 0    levocetirizine (XYZAL) 5 MG tablet Take 1 tablet (5 mg total) by mouth every evening. 30 tablet 11    topiramate (TOPAMAX) 25 MG tablet Take 1 tablet (25 mg total) by mouth every evening. 90 tablet 3     No current facility-administered medications for this visit.         Physical Exam    General: No acute distress. Well-developed. Well-nourished.  Head: Normocephalic. Atraumatic.  Respiratory: Normal respiratory effort.           Health Maintenance         Date Due Completion Date    Influenza Vaccine (1) 09/01/2024 11/30/2005    COVID-19 Vaccine (2 - 2024-25 season) 09/01/2024 12/16/2021    TETANUS VACCINE 06/27/2033 6/27/2023    RSV Vaccine (Age 60+ and Pregnant patients) (1 - 1-dose 75+ series) 11/27/2066 ---            Assessment & Plan      IMPRESSION:  -Assessed recent kidney issue, considering alcohol intake and timing of labs as potential factors  -Suspected kidney stones based on symptoms and response to Toradol injection  -Will order renal imaging to evaluate for presence of stones  -Considered diet and Pepcid as potential contributors to stone formation  -Determined need for repeat labs to ensure renal function has normalized    KIDNEY STONES:  - Explained that fish consumption can contribute to kidney stone formation.  - Discussed how antihistamines like Pepcid can increase risk of stone formation by  causing dehydration.  - Educated on dietary factors contributing to kidney stones, including elevated vitamin D intake, red meats, and processed foods.  - Mr. Abreu to increase water intake significantly.  - Mr. Abreu to reduce consumption of red meat and processed foods.  - Recommend limiting fish intake.  - Ultrasound of kidneys ordered, scheduled for 12th at the [facility].  - Repeat labs ordered to check renal function, scheduled for 12th.  - Follow up with Dr. Foote (urologist) after imaging results.    FOLLOW UP:  - Staff will contact patient to schedule ultrasound and labs for 12th (next Tuesday).        Problem List Items Addressed This Visit          Neuro    Migraine with aura and without status migrainosus, not intractable    Overview     Stable on topamax.             Psychiatric    Anxiety    Overview     Has hydroxyzine for prn use.           Other Visit Diagnoses       DARCIE (acute kidney injury)    -  Primary    Relevant Orders    US Retroperitoneal Complete    Renal Function Panel    Urinalysis    Dehydration        Relevant Orders    Renal Function Panel    Urinalysis    Bilateral flank pain        Relevant Orders    US Retroperitoneal Complete    Renal Function Panel    Urinalysis    Other muscle spasm        Urinary frequency        Relevant Orders    Renal Function Panel    Urinalysis    Chills      - Resolved              Health Maintenance reviewed: Deferred at this time.    The patient location is:  Patient appears to be in a vehicle on the passenger side then home.  The chief complaint leading to consultation is: noted below  Visit type: Virtual visit with synchronous audio and video  Total time spent with patient: 16+ minutes  Each patient to whom he or she provides medical services by telemedicine is:  (1) informed of the relationship between the physician and patient and the respective role of any other health care provider with respect to management of the patient; and (2) notified that he or she  may decline to receive medical services by telemedicine and may withdraw from such care at any time.      16+ minutes of total time spent on the encounter, which includes face to face time and non-face to face time preparing to see the patient (eg, review of tests), Obtaining and/or reviewing separately obtained history, documenting clinical information in the electronic or other health record, independently interpreting results (not separately reported) and communicating results to the patient/family/caregiver, or Care coordination (not separately reported). Visit today included increased complexity associated with the care of the episodic problem addressed and managing the longitudinal care of the patient due to the serious and/or complex managed problem(s).        Sincerely,  Manuel Patel NP              Answers submitted by the patient for this visit:  Review of Systems Questionnaire (Submitted on 11/5/2024)  activity change: No  unexpected weight change: No  neck pain: No  hearing loss: No  rhinorrhea: No  trouble swallowing: No  eye discharge: No  visual disturbance: No  chest tightness: No  wheezing: No  chest pain: No  palpitations: No  blood in stool: No  constipation: No  vomiting: No  diarrhea: No  polydipsia: No  polyuria: Yes  difficulty urinating: No  urgency: Yes  hematuria: No  joint swelling: No  arthralgias: No  headaches: No  weakness: No  confusion: No  dysphoric mood: No

## 2024-11-07 ENCOUNTER — OFFICE VISIT (OUTPATIENT)
Dept: PULMONOLOGY | Facility: CLINIC | Age: 34
End: 2024-11-07
Payer: COMMERCIAL

## 2024-11-07 VITALS
SYSTOLIC BLOOD PRESSURE: 122 MMHG | HEIGHT: 65 IN | BODY MASS INDEX: 24.39 KG/M2 | OXYGEN SATURATION: 98 % | HEART RATE: 65 BPM | RESPIRATION RATE: 14 BRPM | WEIGHT: 146.38 LBS | DIASTOLIC BLOOD PRESSURE: 82 MMHG

## 2024-11-07 DIAGNOSIS — G47.33 OSA (OBSTRUCTIVE SLEEP APNEA): Primary | ICD-10-CM

## 2024-11-07 DIAGNOSIS — G47.8 NON-RESTORATIVE SLEEP: ICD-10-CM

## 2024-11-07 DIAGNOSIS — R06.81 APNEA: ICD-10-CM

## 2024-11-07 PROCEDURE — 99999 PR PBB SHADOW E&M-EST. PATIENT-LVL IV: CPT | Mod: PBBFAC,,, | Performed by: STUDENT IN AN ORGANIZED HEALTH CARE EDUCATION/TRAINING PROGRAM

## 2024-11-07 NOTE — PROGRESS NOTES
Chief complaint:  Chief Complaint   Patient presents with    Sleep Apnea        History of present illness -  HPI   Caesar comes to the clinic referred by his primary care due to difficult-to-control hypertension with concerns of secondary hypertension.  Does have some stigmata of sleep apnea with episodes of gasping and morning headaches as well as witnessed apneas while asleep.  Unfortunately he is a very light sleeper therefore this confounds discrimination of his symptoms.    Symptoms: Witnessed apneas, Choking/gasping, and Morning headaches  Associated conditions: HTN  Occupational history: Works oil platform, operates a crane sometimes.      Sleeping habits:   Bedtime: 10-11 pm   Latency: < 5 min   Wake up time: 6 am   Refreshed: most often than not   Sleep interruptions: at times, but is able to fall again without much trouble.     STOP-Bang Questionnaire (validated TON screening questionnaire)  Negative unless checked off.  [x] Snoring    []  Tired/Fatigued/Sleepy  [] Obstruction (apneas/choking)  [x] Pressure (HTN)  [] BMI >35  [] Age >50  [] Neck >40 cm  [x] Gender male   STOP-Bang = 3 (low risk 0-2,high risk 3-8)    Physical examination -   Physical Exam  Vitals and nursing note reviewed.   Constitutional:       Appearance: Normal appearance.   HENT:      Head: Normocephalic and atraumatic.      Nose: Nose normal.      Mouth/Throat:      Mouth: Mucous membranes are moist.   Eyes:      Extraocular Movements: Extraocular movements intact.      Pupils: Pupils are equal, round, and reactive to light.   Neck:      Comments: Neck is 12 in   Mallampati score is 1  Cardiovascular:      Rate and Rhythm: Normal rate and regular rhythm.   Pulmonary:      Effort: Pulmonary effort is normal.      Breath sounds: Normal breath sounds.   Abdominal:      General: Abdomen is flat. Bowel sounds are normal.      Palpations: Abdomen is soft.   Musculoskeletal:         General: No swelling.   Skin:     General: Skin is warm.  "     Capillary Refill: Capillary refill takes less than 2 seconds.   Neurological:      General: No focal deficit present.      Mental Status: He is alert.        Vitals:    11/07/24 1130   BP: 122/82   Pulse: 65   Resp: 14   SpO2: 98%   Weight: 66.4 kg (146 lb 6.2 oz)   Height: 5' 5" (1.651 m)      Review of Systems   Constitutional: Negative.    HENT: Negative.     Eyes: Negative.    Respiratory: Negative.     Cardiovascular: Negative.    Gastrointestinal: Negative.    Musculoskeletal: Negative.    Skin: Negative.    Neurological: Negative.      Relevant data (Independently reviewed by me) -    Prior notes -   Primary    Labs -   Extensive labs to look for secondary hypertension have been negative so far    Assessment & Plan    Non-restorative sleep  -     Ambulatory referral/consult to Pulmonology    Apnea  -     Ambulatory referral/consult to Pulmonology         The pretest probability of TON is moderate/high therefore we will proceed with a HSAT  He operates heavy machinery at times however I am not concerned as EDS is not a dominant feature of his possible TON.  Depending on the results we will issue CPAP machine or invoke more deep testing to try and find out if sleep disorder breathing or other hypersomnia as are contributing to his other medical problems.    RTC in 3 months    Kirill Connor   11/07/2024    "

## 2024-11-11 ENCOUNTER — TELEPHONE (OUTPATIENT)
Dept: SLEEP MEDICINE | Facility: CLINIC | Age: 34
End: 2024-11-11
Payer: COMMERCIAL

## 2024-11-11 RX ORDER — METOPROLOL TARTRATE 50 MG/1
50 TABLET ORAL
COMMUNITY
End: 2024-11-11 | Stop reason: SDUPTHER

## 2024-11-11 RX ORDER — IVABRADINE 7.5 MG/1
15 TABLET, FILM COATED ORAL
COMMUNITY
End: 2024-11-11 | Stop reason: SDUPTHER

## 2024-11-12 ENCOUNTER — HOSPITAL ENCOUNTER (OUTPATIENT)
Dept: RADIOLOGY | Facility: HOSPITAL | Age: 34
Discharge: HOME | End: 2024-11-12
Attending: NURSE PRACTITIONER
Payer: COMMERCIAL

## 2024-11-12 ENCOUNTER — TELEPHONE (OUTPATIENT)
Dept: PRIMARY CARE CLINIC | Facility: CLINIC | Age: 34
End: 2024-11-12
Payer: COMMERCIAL

## 2024-11-12 DIAGNOSIS — R10.9 BILATERAL FLANK PAIN: ICD-10-CM

## 2024-11-12 DIAGNOSIS — N17.9 AKI (ACUTE KIDNEY INJURY): ICD-10-CM

## 2024-11-12 PROCEDURE — 76770 US EXAM ABDO BACK WALL COMP: CPT | Mod: 26,,, | Performed by: RADIOLOGY

## 2024-11-12 PROCEDURE — 76770 US EXAM ABDO BACK WALL COMP: CPT | Mod: TC

## 2024-11-12 RX ORDER — IVABRADINE 7.5 MG/1
15 TABLET, FILM COATED ORAL
Qty: 2 TABLET | Refills: 0 | Status: SHIPPED | OUTPATIENT
Start: 2024-11-12

## 2024-11-12 RX ORDER — METOPROLOL TARTRATE 50 MG/1
50 TABLET ORAL
Qty: 1 TABLET | Refills: 0 | Status: SHIPPED | OUTPATIENT
Start: 2024-11-12

## 2024-11-12 NOTE — TELEPHONE ENCOUNTER
Reviewed imaging that showed non-obstructing renal calculi.  2. We discussed increasing water intake and decreasing stones that induce stone formation.    Sincerely,  Manuel Patel, NP

## 2024-11-13 ENCOUNTER — TELEPHONE (OUTPATIENT)
Dept: CARDIOLOGY | Facility: HOSPITAL | Age: 34
End: 2024-11-13
Payer: COMMERCIAL

## 2024-11-13 ENCOUNTER — PATIENT MESSAGE (OUTPATIENT)
Dept: CARDIOLOGY | Facility: HOSPITAL | Age: 34
End: 2024-11-13
Payer: COMMERCIAL

## 2024-11-13 NOTE — TELEPHONE ENCOUNTER
Attempted to call patient in regards to their upcoming Cardiac CTA scheduled for 11/18/2024 at 1:00.     Reminder for the patient to fast for 4-hours prior to the test, no caffeine the AM of, and can have water.     For questions or concerns: I am available M-F 7:30-4, please call [phone]. Thank you!

## 2024-11-18 ENCOUNTER — HOSPITAL ENCOUNTER (OUTPATIENT)
Dept: RADIOLOGY | Facility: HOSPITAL | Age: 34
Discharge: HOME | End: 2024-11-18
Payer: COMMERCIAL

## 2024-11-18 VITALS
OXYGEN SATURATION: 100 % | RESPIRATION RATE: 18 BRPM | DIASTOLIC BLOOD PRESSURE: 59 MMHG | SYSTOLIC BLOOD PRESSURE: 106 MMHG | HEART RATE: 62 BPM

## 2024-11-18 DIAGNOSIS — R07.9 CHEST PAIN, UNSPECIFIED TYPE: ICD-10-CM

## 2024-11-18 PROCEDURE — 75574 CT ANGIO HRT W/3D IMAGE: CPT | Mod: TC

## 2024-11-18 PROCEDURE — 25000242 PHARM REV CODE 250 ALT 637 W/ HCPCS

## 2024-11-18 PROCEDURE — 25500020 PHARM REV CODE 255

## 2024-11-18 PROCEDURE — 75574 CT ANGIO HRT W/3D IMAGE: CPT | Mod: 26,,, | Performed by: RADIOLOGY

## 2024-11-18 RX ORDER — NITROGLYCERIN 0.4 MG/1
0.4 TABLET SUBLINGUAL ONCE
Status: COMPLETED | OUTPATIENT
Start: 2024-11-18 | End: 2024-11-18

## 2024-11-18 RX ADMIN — IOHEXOL 100 ML: 350 INJECTION, SOLUTION INTRAVENOUS at 12:11

## 2024-11-18 RX ADMIN — NITROGLYCERIN 0.4 MG: 0.4 TABLET, ORALLY DISINTEGRATING SUBLINGUAL at 12:11

## 2024-11-18 NOTE — NURSING
Scan complete.  Patient tolerated well.  Patient denies HA or dizziness upon sitting or standing.  PIV D/C ed , jelco cath intact.  No bleeding or hematoma noted.  Coflex dressing applied.  Patient given verbal and printed D/C instructions.  Patient instructed to drink two 16 oz bottlles of water today to help flush the contrast from the body by way of the kidneys.  Patient instructed to remove coflex dressing in 10 minutes. Patient verbalized understanding of D/C instructions Patient D/C via wheelchair with [facility]

## 2024-11-18 NOTE — NURSING
Patient arrived to radiology for scheduled cardiac CTA.  Patient given verbal information concerning the scan, need for PIV and side effects of contrast and NTG.  Patient verbalized understanding of verbal information.  Patient placed on cardiac, BP, and pulse ox monitoring.  PIV 20 g placed in RAC X one attempt.  Patient tolerated well.  Preparing for pre-calcium score portion of scan.

## 2024-11-22 ENCOUNTER — TELEPHONE (OUTPATIENT)
Dept: CARDIOLOGY | Facility: CLINIC | Age: 34
End: 2024-11-22
Payer: COMMERCIAL

## 2024-11-22 NOTE — TELEPHONE ENCOUNTER
Yanci Morin NP called and spoke w/ pt. Pt vu w/o q/c    ----- Message from Yanci Morin NP sent at 11/22/2024  3:28 PM CST -----  Called and discussed results with pt, CTA with nml coronaries, myocardial bridging across mid LAD noted.

## 2025-01-02 ENCOUNTER — OFFICE VISIT (OUTPATIENT)
Dept: CARDIOLOGY | Facility: CLINIC | Age: 35
End: 2025-01-02
Payer: COMMERCIAL

## 2025-01-02 DIAGNOSIS — R03.0 ELEVATED BLOOD PRESSURE READING: Primary | ICD-10-CM

## 2025-01-02 DIAGNOSIS — I47.9 TACHYCARDIA, PAROXYSMAL: ICD-10-CM

## 2025-01-02 NOTE — PROGRESS NOTES
Subjective:   Patient ID:  Caesar Abreu is a 34 y.o. male who presents for evaluation of No chief complaint on file.    History of Present Illness:  The patient location is: home  The chief complaint leading to consultation is: palpitations  Visit type: Virtual visit with synchronous audio and video  Total time spent with patient: 15  Each patient to whom he or she provides medical services by telemedicine is:  (1) informed of the relationship between the physician and patient and the respective role of any other health care provider with respect to management of the patient; and (2) notified that he or she may decline to receive medical services by telemedicine and may withdraw from such care at any time.      32y/o M with PMHx of chest pain, HTN, palpitations and anxiety. Here today c/o palpitations and episode of atypical chest pain. Pt states his sxs are worse with any adrenalin, when playing video games he had to stop that his heart was beating so fast he had chest pain and was diaphoretic. EKG today NSR. Echo last year with nml EF. Sig FH of CAD, father CAD, grandfather CHF    1/2/25  Here today via virtual visit for CV follow-up.  Cardiac CTA with normal coronaries, mid LAD myocardial bridging noted.  Patient reports occasional palpitations not associated with activity or stress, however he reports it does not bother him as much.  He tries to limit his caffeine intake and does not use energy drinks.  He reports blood pressure fluctuating, including an occurrence while working off sure requiring flight in to hospital for uncontrolled hypertension.  Most recent readings low 100/50s he does not check it at home    Holter with no concerning arrhythmias      Review of Systems   Constitutional: Negative   HENT: Negative.     Eyes: Negative.    Cardiovascular:  Positive for palpitations (rare).   Respiratory: Negative.     Skin: Negative.    Musculoskeletal: Negative.    Gastrointestinal: Negative.     Genitourinary: Negative.    Neurological: Negative.    Psychiatric/Behavioral:  Negative      Virtual visit  AaOx4      Most Recent Laboratory Results Reviewed ({Yes)  Lab Results   Component Value Date    WBC 10.35 11/01/2024    HGB 14.4 11/01/2024    HCT 42.8 11/01/2024     11/01/2024    CHOL 166 07/03/2023    TRIG 77 07/03/2023    HDL 45 07/03/2023    ALT 28 11/01/2024    AST 25 11/01/2024     11/12/2024    K 4.8 11/12/2024     11/12/2024    CREATININE 0.9 11/12/2024    BUN 8 11/12/2024    CO2 27 11/12/2024    TSH 0.824 06/25/2024    HGBA1C 5.1 06/25/2024       Assessment     ICD-10-CM ICD-9-CM   1. Tachycardia, paroxysmal  I47.9 427.2        Plan   Tachycardia, paroxysmal         Cont RF modifications  If CP or palpitations resume, return to clinic or ED  Can use Metoprolol as needed for palpitations  Consider EP eval if symptoms continue    RTC 6m or sooner if needed    No follow-ups on file.  Future Appointments       Date Provider Specialty Appt Notes    1/7/2025  Sleep Medicine [facility]     2/7/2025 Kirill Connor MD Pulmonology [facility]            Past Medical History:   Diagnosis Date    History of chicken pox     Kidney stones        Past Surgical History:   Procedure Laterality Date    ADENOIDECTOMY      arm fracture Right     FRACTURE SURGERY  2007    Right arm    TYMPANOSTOMY TUBE PLACEMENT         Social History     Tobacco Use    Smoking status: Never     Passive exposure: Never    Smokeless tobacco: Never   Substance Use Topics    Alcohol use: Yes     Alcohol/week: 2.0 standard drinks of alcohol     Types: 2 Shots of liquor per week     Comment: twice a week     Drug use: Never       Family History   Problem Relation Name Age of Onset    No Known Problems Mother      Cancer Father Darian garay         prostate    Cancer Maternal Grandmother Katty Abreu         breast     Diabetes Maternal Grandmother Katty Abreu     Hearing loss Maternal Grandmother Katty Abreu      Stroke Maternal Grandmother Katty Abreu     Cancer Maternal Grandfather Luis Alberto abreu     COPD Maternal Grandfather Luis Alberto abreu        Current Outpatient Medications on File Prior to Visit   Medication Sig Dispense Refill    baclofen (LIORESAL) 10 MG tablet Take 1 tablet (10 mg total) by mouth 3 (three) times daily. 90 tablet 11    busPIRone (BUSPAR) 5 MG Tab Take 1 tablet (5 mg total) by mouth 2 (two) times daily. 180 tablet 2    EPINEPHrine (EPIPEN) 0.3 mg/0.3 mL AtIn Inject 0.3 mLs (0.3 mg total) into the muscle once. for 1 dose 2 each 3    hydrOXYzine HCL (ATARAX) 25 MG tablet Take 1 tablet (25 mg total) by mouth 3 (three) times daily as needed for Anxiety. 30 tablet 0    CORLANOR 7.5 mg Tab Take 2 tablets (15 mg total) by mouth On call Procedure (take 2-hours prior to cardiac CTA). 2 tablet 0    levocetirizine (XYZAL) 5 MG tablet Take 1 tablet (5 mg total) by mouth every evening. 30 tablet 11    metoprolol tartrate (LOPRESSOR) 50 MG tablet Take 1 tablet (50 mg total) by mouth On call Procedure (take 2-hours prior to cardiac CTA). 1 tablet 0    topiramate (TOPAMAX) 25 MG tablet Take 1 tablet (25 mg total) by mouth every evening. 90 tablet 3     No current facility-administered medications on file prior to visit.      Wt Readings from Last 3 Encounters:   11/07/24 66.4 kg (146 lb 6.2 oz)   11/01/24 69.4 kg (153 lb 1.8 oz)   10/10/24 64.9 kg (143 lb)     Temp Readings from Last 3 Encounters:   11/01/24 97.5 °F (36.4 °C) (Tympanic)   06/25/24 97.3 °F (36.3 °C) (Tympanic)   12/13/23 97.3 °F (36.3 °C) (Tympanic)     BP Readings from Last 3 Encounters:   11/18/24 (!) 106/59   11/07/24 122/82   11/01/24 124/82     Pulse Readings from Last 3 Encounters:   11/18/24 62   11/07/24 65   11/01/24 74            Objective:       Lab Results   Component Value Date    CHOL 166 07/03/2023     Lab Results   Component Value Date    HDL 45 07/03/2023     Lab Results   Component Value Date    LDLCALC 105.6 07/03/2023     Lab Results  "  Component Value Date    TRIG 77 07/03/2023     Lab Results   Component Value Date    CHOLHDL 27.1 07/03/2023       Chemistry        Component Value Date/Time     11/12/2024 0938    K 4.8 11/12/2024 0938     11/12/2024 0938    CO2 27 11/12/2024 0938    BUN 8 11/12/2024 0938    CREATININE 0.9 11/12/2024 0938    GLU 92 11/12/2024 0938        Component Value Date/Time    CALCIUM 9.5 11/12/2024 0938    ALKPHOS 75 11/01/2024 1515    AST 25 11/01/2024 1515    ALT 28 11/01/2024 1515    BILITOT 2.6 (H) 11/01/2024 1515          Lab Results   Component Value Date    TSH 0.824 06/25/2024     No results found for: "INR", "PROTIME"  @RESUFAST(WBC,HGB,HCT,MCV,PLT)  @LABRCNTIP(BNP,BNPTRIAGEBLO)@  CrCl cannot be calculated (Patient's most recent lab result is older than the maximum 7 days allowed.).     Results for orders placed during the hospital encounter of 11/21/23    Echo    Interpretation Summary    Left Ventricle: The left ventricle is normal in size. Normal wall thickness. Normal wall motion. There is normal systolic function with a visually estimated ejection fraction of 60 - 65%. There is normal diastolic function.    Right Ventricle: Normal right ventricular cavity size. Wall thickness is normal. Right ventricle wall motion  is normal. Systolic function is normal.    Pulmonary Artery: The estimated pulmonary artery systolic pressure is 17 mmHg.    IVC/SVC: Normal venous pressure at 3 mmHg.     No results found for this or any previous visit.     Assessment:      1. Tachycardia, paroxysmal        Plan:   Tachycardia, paroxysmal          Courtney Guillot, FNP-C [facility], Cardiology      "

## 2025-01-10 ENCOUNTER — HOSPITAL ENCOUNTER (OUTPATIENT)
Dept: SLEEP MEDICINE | Facility: HOSPITAL | Age: 35
Discharge: HOME | End: 2025-01-10
Attending: STUDENT IN AN ORGANIZED HEALTH CARE EDUCATION/TRAINING PROGRAM
Payer: COMMERCIAL

## 2025-01-10 DIAGNOSIS — G47.33 OSA (OBSTRUCTIVE SLEEP APNEA): ICD-10-CM

## 2025-01-10 PROCEDURE — 95806 SLEEP STUDY UNATT&RESP EFFT: CPT | Mod: 26,,, | Performed by: INTERNAL MEDICINE

## 2025-01-10 PROCEDURE — 95806 SLEEP STUDY UNATT&RESP EFFT: CPT | Performed by: INTERNAL MEDICINE

## 2025-01-10 NOTE — PROCEDURES
PHYSICIAN INTERPRETATION AND COMMENTS: Findings are consistent with mild, non-positional obstructive sleep apnea(TON). CLINICAL HISTORY: 34 year old male presented with: 15.5 inch neck, BMI of 25.9, an [place] sleepiness score of 8, no comorbidities and symptoms of nocturnal waking up choking and witnessed apneas. Based on the clinical history, the patient has a low pre-test probability of having Mild TON. SLEEP STUDY FINDINGS: Patient underwent a 1 night Home Sleep Test and by behavioral criteria, slept for approximately 6.36 hours, with a sleep latency of 6 minutes and a sleep efficiency of 93%. The patient slept supine 47.4% of the night based on valid recording time of 6.14 hours. When considering more subtle measures of sleep disordered breathing, the overall respiratory disturbance index is mild(RDI=15) based on a 1% hypopnea desaturation criteria with confirmation by surrogate arousal indicators. The apneas/hypopneas are accompanied by minimal oxygen desaturation (percent time below 90% SpO2: 0%, Min SpO2: 93.3%). The average desaturation across all sleep disordered breathing events is 1.1%. Snoring occurs for 0.1% (30 dB) of the study. The mean pulse rate is 57.4 BPM, with very frequent pulse rate variability (93 events with >= 6 BPM increase/decrease per hour). TREATMENT CONSIDERATIONS: Therapies that can be considered include: a) Treatment of allergic rhinitis if present with trial of daily nasal steroid for at least 6 weeks +/- leukotriene receptor antagonist (e.g. montelukast) b) Referral to a dentist specializing in mandibular-advancing oral appliances. c) Surgical correction of any upper airway abnormalities that may exist (e.g. turbinate reduction, nasal septoplasty, etc.) to reduce upper airway resistance. d) Myofunctional therapy (e.g. isometric and isotonic exercises) to reestablish nasal breathing and strengthen the tongue and buccal muscles. If the aforementioned measures are not successful, a  trial of Auto-CPAP can always be considered. DISEASE MANAGEMENT CONSIDERATIONS: The patient will be seen for a post-evaluation Sleep Medicine follow up to discuss the above results and treatment recommendations.

## 2025-02-13 ENCOUNTER — OFFICE VISIT (OUTPATIENT)
Dept: PULMONOLOGY | Facility: CLINIC | Age: 35
End: 2025-02-13
Payer: COMMERCIAL

## 2025-02-13 VITALS
HEIGHT: 65 IN | WEIGHT: 149.94 LBS | OXYGEN SATURATION: 98 % | BODY MASS INDEX: 24.98 KG/M2 | DIASTOLIC BLOOD PRESSURE: 62 MMHG | RESPIRATION RATE: 21 BRPM | HEART RATE: 64 BPM | SYSTOLIC BLOOD PRESSURE: 112 MMHG

## 2025-02-13 DIAGNOSIS — F51.12 INSUFFICIENT SLEEP SYNDROME: Primary | ICD-10-CM

## 2025-02-13 PROCEDURE — 1159F MED LIST DOCD IN RCRD: CPT | Mod: CPTII,S$GLB,, | Performed by: STUDENT IN AN ORGANIZED HEALTH CARE EDUCATION/TRAINING PROGRAM

## 2025-02-13 PROCEDURE — 3078F DIAST BP <80 MM HG: CPT | Mod: CPTII,S$GLB,, | Performed by: STUDENT IN AN ORGANIZED HEALTH CARE EDUCATION/TRAINING PROGRAM

## 2025-02-13 PROCEDURE — 99212 OFFICE O/P EST SF 10 MIN: CPT | Mod: S$GLB,,, | Performed by: STUDENT IN AN ORGANIZED HEALTH CARE EDUCATION/TRAINING PROGRAM

## 2025-02-13 PROCEDURE — 99999 PR PBB SHADOW E&M-EST. PATIENT-LVL III: CPT | Mod: PBBFAC,,, | Performed by: STUDENT IN AN ORGANIZED HEALTH CARE EDUCATION/TRAINING PROGRAM

## 2025-02-13 PROCEDURE — 3074F SYST BP LT 130 MM HG: CPT | Mod: CPTII,S$GLB,, | Performed by: STUDENT IN AN ORGANIZED HEALTH CARE EDUCATION/TRAINING PROGRAM

## 2025-02-13 PROCEDURE — 3008F BODY MASS INDEX DOCD: CPT | Mod: CPTII,S$GLB,, | Performed by: STUDENT IN AN ORGANIZED HEALTH CARE EDUCATION/TRAINING PROGRAM

## 2025-02-13 NOTE — PROGRESS NOTES
"Chief complaint:  Chief Complaint   Patient presents with    Follow-up    Sleep Apnea        History of present illness -  Follow-up       Established clinic patient back in November of 2024 for screening of secondary hypertension.    At the time the patient was struggling with controlling his high blood pressure even with medication, and sleep apnea was invoked as a potential secondary cause.    A sleep study was performed    Interval history   02/13/2025   Patient comes back to clinic to review the results of his sleep study.    See below  Tells me that blood pressure is now controlled without pharmacologic interventions, he was successful in implementing lifestyle modifications diet exercise and reduction of his alcohol intake.    Comes mostly to review his results    Physical examination -   Vitals:    02/13/25 0727   BP: 112/62   BP Location: Right arm   Patient Position: Sitting   Pulse: 64   Resp: (!) 21   SpO2: 98%   Weight: 68 kg (149 lb 14.6 oz)   Height: 5' 5" (1.651 m)      Review of Systems   Constitutional: Negative.    HENT: Negative.     Eyes: Negative.    Respiratory: Negative.     Cardiovascular: Negative.    Gastrointestinal: Negative.    Musculoskeletal: Negative.    Skin: Negative.    Neurological: Negative.        Relevant data (Independently reviewed by me) -    Sleep study:  01/10/2025 home sleep study was negative for sleep apnea, his AHI was 0, his RDI was 15 and his oxygen saturation rich was 93%    Assessment & Plan    Insufficient sleep syndrome    As suspected during last visit his likelihood of sleep apnea was fairly low, this was further prove with a negative sleep study.  His blood pressure is better controlled with lifestyle modifications proven that is sleep apnea it is not contributing to his disease.    He is a very light sleeper and probably suffering from insufficient sleep due to this, however he is able to function for the most part and do his activities of daily living even " with the amount of sleep that he gets and a nightly basis.      Kirill Connor   02/13/2025

## 2025-03-26 DIAGNOSIS — F41.9 ANXIETY: ICD-10-CM

## 2025-03-26 RX ORDER — BUSPIRONE HYDROCHLORIDE 5 MG/1
5 TABLET ORAL 2 TIMES DAILY
Qty: 180 TABLET | Refills: 2 | Status: SHIPPED | OUTPATIENT
Start: 2025-03-26

## 2025-03-26 NOTE — TELEPHONE ENCOUNTER
No care due was identified.  [facility] Embedded Care Due Messages. Reference number: [id number].   3/26/2025 12:21:07 AM CDT

## 2025-05-19 ENCOUNTER — PATIENT MESSAGE (OUTPATIENT)
Dept: ALLERGY | Facility: CLINIC | Age: 35
End: 2025-05-19
Payer: COMMERCIAL

## 2025-05-19 DIAGNOSIS — Z91.018 FOOD ALLERGY: ICD-10-CM

## 2025-05-19 RX ORDER — EPINEPHRINE 0.3 MG/.3ML
1 INJECTION SUBCUTANEOUS ONCE
Qty: 2 EACH | Refills: 3 | OUTPATIENT
Start: 2025-05-19 | End: 2025-05-19

## 2025-06-22 DIAGNOSIS — J30.89 ALLERGIC RHINITIS DUE TO DERMATOPHAGOIDES PTERONYSSINUS: ICD-10-CM

## 2025-06-22 DIAGNOSIS — J30.81 ALLERGIC RHINITIS DUE TO CATS: ICD-10-CM

## 2025-06-23 RX ORDER — LEVOCETIRIZINE DIHYDROCHLORIDE 5 MG/1
5 TABLET, FILM COATED ORAL NIGHTLY
Qty: 90 TABLET | Refills: 3 | OUTPATIENT
Start: 2025-06-23

## 2025-07-22 ENCOUNTER — OFFICE VISIT (OUTPATIENT)
Dept: INTERNAL MEDICINE | Facility: CLINIC | Age: 35
End: 2025-07-22
Payer: COMMERCIAL

## 2025-07-22 ENCOUNTER — APPOINTMENT (OUTPATIENT)
Dept: LAB | Facility: HOSPITAL | Age: 35
End: 2025-07-22
Attending: INTERNAL MEDICINE
Payer: COMMERCIAL

## 2025-07-22 VITALS
HEIGHT: 65 IN | DIASTOLIC BLOOD PRESSURE: 82 MMHG | WEIGHT: 145.5 LBS | HEART RATE: 59 BPM | SYSTOLIC BLOOD PRESSURE: 118 MMHG | BODY MASS INDEX: 24.24 KG/M2 | OXYGEN SATURATION: 97 % | TEMPERATURE: 97 F

## 2025-07-22 DIAGNOSIS — R23.2 FLUSHING: ICD-10-CM

## 2025-07-22 DIAGNOSIS — Z00.00 ROUTINE GENERAL MEDICAL EXAMINATION AT A HEALTH CARE FACILITY: Primary | ICD-10-CM

## 2025-07-22 DIAGNOSIS — G44.52 NEW DAILY PERSISTENT HEADACHE: ICD-10-CM

## 2025-07-22 DIAGNOSIS — I10 HYPERTENSION, UNSPECIFIED TYPE: ICD-10-CM

## 2025-07-22 DIAGNOSIS — F41.9 ANXIETY: ICD-10-CM

## 2025-07-22 PROCEDURE — 3044F HG A1C LEVEL LT 7.0%: CPT | Mod: CPTII,S$GLB,, | Performed by: INTERNAL MEDICINE

## 2025-07-22 PROCEDURE — 1159F MED LIST DOCD IN RCRD: CPT | Mod: CPTII,S$GLB,, | Performed by: INTERNAL MEDICINE

## 2025-07-22 PROCEDURE — 4010F ACE/ARB THERAPY RXD/TAKEN: CPT | Mod: CPTII,S$GLB,, | Performed by: INTERNAL MEDICINE

## 2025-07-22 PROCEDURE — 3079F DIAST BP 80-89 MM HG: CPT | Mod: CPTII,S$GLB,, | Performed by: INTERNAL MEDICINE

## 2025-07-22 PROCEDURE — 99214 OFFICE O/P EST MOD 30 MIN: CPT | Mod: 25,S$GLB,, | Performed by: INTERNAL MEDICINE

## 2025-07-22 PROCEDURE — 3008F BODY MASS INDEX DOCD: CPT | Mod: CPTII,S$GLB,, | Performed by: INTERNAL MEDICINE

## 2025-07-22 PROCEDURE — 3074F SYST BP LT 130 MM HG: CPT | Mod: CPTII,S$GLB,, | Performed by: INTERNAL MEDICINE

## 2025-07-22 PROCEDURE — 99999 PR PBB SHADOW E&M-EST. PATIENT-LVL V: CPT | Mod: PBBFAC,,, | Performed by: INTERNAL MEDICINE

## 2025-07-22 PROCEDURE — 99395 PREV VISIT EST AGE 18-39: CPT | Mod: S$GLB,,, | Performed by: INTERNAL MEDICINE

## 2025-07-22 RX ORDER — LOSARTAN POTASSIUM 25 MG/1
25 TABLET ORAL DAILY
Qty: 90 TABLET | Refills: 1 | Status: SHIPPED | OUTPATIENT
Start: 2025-07-22 | End: 2026-07-22

## 2025-07-22 RX ORDER — BUSPIRONE HYDROCHLORIDE 5 MG/1
5 TABLET ORAL 2 TIMES DAILY
Qty: 180 TABLET | Refills: 2 | Status: SHIPPED | OUTPATIENT
Start: 2025-07-22

## 2025-07-22 NOTE — PROGRESS NOTES
Subjective:      Patient ID: Caesar Abreu is a 34 y.o. male.    Chief Complaint: Hypertension    HPI  History of Present Illness                 34 y.o. with  Problem List[1]  Past Medical History:   Diagnosis Date    History of chicken pox     Kidney stones        Here today for annual prev exams and management of mult med problems as outlined below.  Compliant with meds without significant side effects. Energy and appetite are good.     No need for hydroxyzine x 6 months.     Home bp rising over past 2 months. Usually 130s/80s but can be 160s/90s    Has had 1.5 months head pressure. Thiago pariatel areas. Has decreased caffeine. Has essentially stopped etoh. Trying mag citrate otc.   Has been back on topiramate for at least one month. No emesis. Occ nausea. No trauma.    Feels different from typical migraines. Topamax has resolved migraines.     C/o episodes of difficulty thinking and speaking. Speech can be slurred. Lasts 10 to 15 seconds. Resolves spontaneously.   Does not feel this is related to anxiety nor high blood pressure.     Past Surgical History:   Procedure Laterality Date    ADENOIDECTOMY      arm fracture Right     FRACTURE SURGERY  2007    Right arm    TYMPANOSTOMY TUBE PLACEMENT       Social History[2]  family history includes COPD in his maternal grandfather; Cancer in his father, maternal grandfather, and maternal grandmother; Diabetes in his maternal grandmother; Hearing loss in his maternal grandmother; No Known Problems in his mother; Stroke in his maternal grandmother.        Review of Systems   Constitutional:  Negative for chills and fever.   HENT:  Negative for ear pain and sore throat.    Respiratory:  Negative for cough.    Cardiovascular:  Negative for chest pain.   Gastrointestinal:  Negative for abdominal pain and blood in stool.   Genitourinary:  Negative for dysuria and hematuria.   Neurological:  Negative for seizures and syncope.     Objective:   /82   Pulse (!) 59   Temp  "97.1 °F (36.2 °C)   Ht 5' 5" (1.651 m)   Wt 66 kg (145 lb 8.1 oz)   SpO2 97%   BMI 24.21 kg/m²     Physical Exam  Constitutional:       General: He is awake. He is not in acute distress.     Appearance: Normal appearance. He is well-developed.   HENT:      Head: Normocephalic and atraumatic.   Eyes:      Extraocular Movements: Extraocular movements intact.      Conjunctiva/sclera: Conjunctivae normal.   Neck:      Thyroid: No thyromegaly.   Cardiovascular:      Rate and Rhythm: Normal rate and regular rhythm.   Pulmonary:      Effort: Pulmonary effort is normal.      Breath sounds: Normal breath sounds. No wheezing or rales.   Abdominal:      General: Bowel sounds are normal.      Palpations: Abdomen is soft.      Tenderness: There is no abdominal tenderness.   Musculoskeletal:         General: No swelling.      Cervical back: Normal range of motion and neck supple. No rigidity.   Lymphadenopathy:      Cervical: No cervical adenopathy.   Skin:     General: Skin is warm and dry.   Neurological:      Mental Status: He is alert and oriented to person, place, and time. Mental status is at baseline.   Psychiatric:         Mood and Affect: Mood normal.         Behavior: Behavior normal. Behavior is cooperative.         Thought Content: Thought content normal.         Judgment: Judgment normal.         Lab Results   Component Value Date    WBC 5.24 07/22/2025    HGB 14.5 07/22/2025    HGB 14.4 11/01/2024    HGB 15.6 06/25/2024    HCT 43.8 07/22/2025    MCV 90 07/22/2025    MCV 90 11/01/2024    MCV 90 06/25/2024     07/22/2025    CHOL 144 07/22/2025    TRIG 115 07/22/2025    HDL 35 (L) 07/22/2025    LDLCALC 86.0 07/22/2025    LDLCALC 105.6 07/03/2023    ALT 29 07/22/2025    AST 19 07/22/2025     07/22/2025    K 5.0 07/22/2025    CALCIUM 9.4 07/22/2025     07/22/2025    CO2 25 07/22/2025    BUN 13 07/22/2025    CREATININE 1.1 07/22/2025    CREATININE 0.9 11/12/2024    CREATININE 1.7 (H) 11/01/2024    " EGFRNORACEVR >60 07/22/2025    EGFRNORACEVR >60.0 11/12/2024    EGFRNORACEVR 54 (A) 11/01/2024    TSH 1.092 07/22/2025    TSH 0.824 06/25/2024    TSH 2.21 06/10/2023    GLU 88 07/22/2025    HGBA1C 5.2 07/22/2025    HGBA1C 5.1 06/25/2024          The ASCVD Risk score (Fred THOMAS, et al., 2019) failed to calculate for the following reasons:    The 2019 ASCVD risk score is only valid for ages 40 to 79     Assessment:     1. Routine general medical examination at a health care facility    2. Anxiety    3. New daily persistent headache    4. Flushing    5. Hypertension, unspecified type      Plan:   1. Routine general medical examination at a health care facility  Overview:  Heart healthy diet, regular exercise, and regular use of sunscreen.    reviewed    Orders:  -     Comprehensive Metabolic Panel; Future; Expected date: 07/22/2025  -     CBC Auto Differential; Future; Expected date: 07/22/2025  -     TSH; Future; Expected date: 07/22/2025  -     Lipid Panel; Future; Expected date: 07/22/2025  -     HIV 1/2 Ag/Ab (4th Gen); Future; Expected date: 07/22/2025  -     Hemoglobin A1C; Future; Expected date: 07/22/2025    2. Anxiety  Overview:  Has hydroxyzine for prn use.     Orders:  -     busPIRone (BUSPAR) 5 MG Tab; Take 1 tablet (5 mg total) by mouth 2 (two) times daily.  Dispense: 180 tablet; Refill: 2    3. New daily persistent headache  -     Ambulatory referral/consult to Neurology; Future; Expected date: 07/29/2025  -     CT Head Without Contrast; Future; Expected date: 07/22/2025    4. Flushing  -     5-HIAA Plasma (Neuoendocrine); Future; Expected date: 07/22/2025  -     Metanephrines, Plasma Free; Future; Expected date: 07/22/2025  -     Urinalysis, Reflex to Urine Culture Urine, Clean Catch; Future; Expected date: 07/22/2025    5. Hypertension, unspecified type  -     losartan (COZAAR) 25 MG tablet; Take 1 tablet (25 mg total) by mouth once daily.  Dispense: 90 tablet; Refill: 1        Patient Instructions    Tylenol 500 to 1000 mg every 12 hours as needed for headache    Try cool packs for headache.     Future Appointments   Date Time Provider Department Center   7/28/2025  1:30 PM [facility] LIMIT 500 LBS [facility] CT SCAN [facility]   8/25/2025 10:40 AM Marc Siegel MD [facility] IM [facility]   9/10/2025  8:30 AM Andressa Velazquez NP [facility] NEURO [facility]       Lab Frequency Next Occurrence   Metanephrines, urine Once 10/25/2024       Follow up in about 4 weeks (around 8/19/2025), or if symptoms worsen or fail to improve.                  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    Family history of heart disease    Tachycardia, paroxysmal    Palpitation    H/O allergy to shellfish    TON (obstructive sleep apnea)   [2]   Social History  Socioeconomic History    Marital status:    Tobacco Use    Smoking status: Never     Passive exposure: Never    Smokeless tobacco: Never   Substance and Sexual Activity    Alcohol use: Yes     Alcohol/week: 2.0 standard drinks of alcohol     Types: 2 Shots of liquor per week     Comment: twice a week     Drug use: Never    Sexual activity: Yes     Partners: Female     Birth control/protection: Inserts     Social Drivers of Health     Financial Resource Strain: Low Risk  (7/22/2025)    Overall Financial Resource Strain (CARDIA)     Difficulty of Paying Living Expenses: Not hard at all   Food Insecurity: No Food Insecurity (7/22/2025)    Hunger Vital Sign     Worried About Running Out of Food in the Last Year: Never true     Ran Out of Food in the Last Year: Never true   Transportation Needs: No Transportation Needs (7/22/2025)    PRAPARE - Transportation     Lack  of Transportation (Medical): No     Lack of Transportation (Non-Medical): No   Physical Activity: Insufficiently Active (7/22/2025)    Exercise Vital Sign     Days of Exercise per Week: 3 days     Minutes of Exercise per Session: 30 min   Stress: No Stress Concern Present (7/22/2025)    [employer] [place] [employer] - Occupational Stress Questionnaire     Feeling of Stress : Only a little   Housing Stability: Low Risk  (7/22/2025)    Housing Stability Vital Sign     Unable to Pay for Housing in the Last Year: No     Number of Times Moved in the Last Year: 0     Homeless in the Last Year: No

## 2025-07-28 ENCOUNTER — HOSPITAL ENCOUNTER (OUTPATIENT)
Dept: RADIOLOGY | Facility: HOSPITAL | Age: 35
Discharge: HOME | End: 2025-07-28
Attending: INTERNAL MEDICINE
Payer: COMMERCIAL

## 2025-07-28 DIAGNOSIS — G44.52 NEW DAILY PERSISTENT HEADACHE: ICD-10-CM

## 2025-07-28 PROCEDURE — 70450 CT HEAD/BRAIN W/O DYE: CPT | Mod: TC

## 2025-07-28 PROCEDURE — 70450 CT HEAD/BRAIN W/O DYE: CPT | Mod: 26,,, | Performed by: RADIOLOGY

## 2025-07-29 DIAGNOSIS — Z91.018 FOOD ALLERGY: ICD-10-CM

## 2025-07-31 RX ORDER — EPINEPHRINE 0.3 MG/.3ML
1 INJECTION SUBCUTANEOUS ONCE
Qty: 2 EACH | Refills: 3 | Status: SHIPPED | OUTPATIENT
Start: 2025-07-31 | End: 2025-07-31